# Patient Record
Sex: MALE | Race: OTHER | Employment: FULL TIME | URBAN - METROPOLITAN AREA
[De-identification: names, ages, dates, MRNs, and addresses within clinical notes are randomized per-mention and may not be internally consistent; named-entity substitution may affect disease eponyms.]

---

## 2017-06-02 ENCOUNTER — OFFICE VISIT (OUTPATIENT)
Dept: INTERNAL MEDICINE CLINIC | Age: 57
End: 2017-06-02

## 2017-06-02 VITALS
OXYGEN SATURATION: 100 % | DIASTOLIC BLOOD PRESSURE: 74 MMHG | TEMPERATURE: 97.9 F | WEIGHT: 189.8 LBS | RESPIRATION RATE: 16 BRPM | HEIGHT: 70 IN | HEART RATE: 72 BPM | BODY MASS INDEX: 27.17 KG/M2 | SYSTOLIC BLOOD PRESSURE: 144 MMHG

## 2017-06-02 DIAGNOSIS — I10 ESSENTIAL HYPERTENSION, BENIGN: ICD-10-CM

## 2017-06-02 DIAGNOSIS — Z12.5 PROSTATE CANCER SCREENING: ICD-10-CM

## 2017-06-02 DIAGNOSIS — M54.50 ACUTE RIGHT-SIDED LOW BACK PAIN WITHOUT SCIATICA: ICD-10-CM

## 2017-06-02 DIAGNOSIS — J01.80 OTHER ACUTE SINUSITIS, RECURRENCE NOT SPECIFIED: Primary | ICD-10-CM

## 2017-06-02 DIAGNOSIS — E78.5 DYSLIPIDEMIA: ICD-10-CM

## 2017-06-02 DIAGNOSIS — Z12.11 COLON CANCER SCREENING: ICD-10-CM

## 2017-06-02 RX ORDER — AZITHROMYCIN 250 MG/1
250 TABLET, FILM COATED ORAL SEE ADMIN INSTRUCTIONS
Qty: 6 TAB | Refills: 0 | Status: SHIPPED | OUTPATIENT
Start: 2017-06-02 | End: 2017-06-07

## 2017-06-02 NOTE — PROGRESS NOTES
HISTORY OF PRESENT ILLNESS  Karen Maier is a 55 y.o. male. HPI   F/u HTN dyslipidemia  Stopped smoking 2 weeks ago, takes wellbutrin just once daily  Has been taking lipitor about every other day  Pain in left back area , worse with bending of back , sxs x 5 days  C/o sinus drainage and chest congestion, clear /white. Some cold sweats  Some bph and ED sxs--newly remarried    Patient Active Problem List    Diagnosis Date Noted    MVP (mitral valve prolapse) 03/12/2012    Essential hypertension, benign 02/15/2010    Depression with anxiety 02/15/2010    Cigarette smoker 02/15/2010    Fibromyalgia 02/15/2010    Concussion 02/15/2010    Dyslipidemia 02/15/2010     Current Outpatient Prescriptions   Medication Sig Dispense Refill    azithromycin (ZITHROMAX) 250 mg tablet Take 1 Tab by mouth See Admin Instructions for 5 days. 6 Tab 0    propranolol LA (INDERAL LA) 80 mg SR capsule TAKE 2 CAPSULES DAILY 180 Cap 3    atorvastatin (LIPITOR) 10 mg tablet Take 1 Tab by mouth daily. 90 Tab 3    amLODIPine-benazepril (LOTREL) 5-20 mg per capsule Take 1 Cap by mouth daily. 90 Cap 3    buPROPion SR (WELLBUTRIN SR) 150 mg SR tablet Take 1 Tab by mouth two (2) times a day. 180 Tab 3    multivitamin (ONE A DAY) tablet Take 1 Tab by mouth daily.  Glucosamine &Chondroit-MV-Min3 812-007-73-0.5 mg Tab Take  by mouth two (2) times a day.        Allergies   Allergen Reactions    Niaspan [Niacin] Other (comments)     flushing      Lab Results  Component Value Date/Time   Glucose 86 10/21/2016 09:40 AM   LDL, calculated 85 10/21/2016 09:40 AM   Creatinine 0.93 10/21/2016 09:40 AM      Lab Results  Component Value Date/Time   Cholesterol, total 133 10/21/2016 09:40 AM   HDL Cholesterol 32 10/21/2016 09:40 AM   LDL, calculated 85 10/21/2016 09:40 AM   Triglyceride 78 10/21/2016 09:40 AM   CHOL/HDL Ratio 6.2 03/05/2010 08:58 AM       Lab Results  Component Value Date/Time   GFR est  10/21/2016 09:40 AM   GFR est non-AA 93 10/21/2016 09:40 AM   Creatinine 0.93 10/21/2016 09:40 AM   BUN 11 10/21/2016 09:40 AM   Sodium 136 10/21/2016 09:40 AM   Potassium 4.6 10/21/2016 09:40 AM   Chloride 97 10/21/2016 09:40 AM   CO2 25 10/21/2016 09:40 AM         ROS    Physical Exam   Constitutional: He appears well-developed and well-nourished. No distress. Appears stated age   HENT:   Head: Normocephalic. Cardiovascular: Normal rate, regular rhythm and normal heart sounds. Pulmonary/Chest: Effort normal and breath sounds normal.   Abdominal: Soft. Musculoskeletal: He exhibits no edema. Neurological: He is alert. Psychiatric: He has a normal mood and affect. Nursing note and vitals reviewed. ASSESSMENT and PLAN  Marino Flynn was seen today for blood pressure check and back pain. Diagnoses and all orders for this visit:    Other acute sinusitis, recurrence not specified   zpak  Acute right-sided low back pain without sciatica   Doubt stone, pt declines ua and xr KUB   Pt will notify MD if not improved   Heat, ice , nsaids  Essential hypertension, benign   controlled  Dyslipidemia   Taking lipitor qod  Prostate cancer screening  -     PSA - PROSTATE SPECIFIC AG  -     REFERRAL TO UROLOGY    Colon cancer screening  -     REFERRAL TO GASTROENTEROLOGY    Other orders  -     azithromycin (ZITHROMAX) 250 mg tablet; Take 1 Tab by mouth See Admin Instructions for 5 days. Follow-up Disposition:  Return in about 6 months (around 12/2/2017) for htn hld .

## 2017-06-02 NOTE — PROGRESS NOTES
Chief Complaint   Patient presents with    Blood Pressure Check     6 mo f/u    Back Pain     going on since last Sunday. Feels like he has flu or kidney stone, causing nausea. Better today than has been     Health Maintenance Due   Topic Date Due    Pneumococcal 19-64 Medium Risk (1 of 1 - PPSV23) 11/11/1980    FOBT Q 1 YEAR AGE 50-75  11/11/2011    DTaP/Tdap/Td series (1 - Tdap) 03/16/2013     Coordination of Care Questions    1. Have you been to the ER, urgent care clinic since your last visit? No       Hospitalized since your last visit? No    2. Have you seen or consulted any other health care providers outside of the [address] [place] [address] since your last visit? Include any pap smears or colon screening.  No

## 2017-06-02 NOTE — MR AVS SNAPSHOT
Visit Information Date & Time Provider Department Dept. Phone Encounter #  
 6/2/2017  8:45 AM Perry Bolton, [address] [place] [address],[address] [phone] [id number] Follow-up Instructions Return in about 6 months (around 12/2/2017) for htn hld . Upcoming Health Maintenance Date Due Pneumococcal 19-64 Medium Risk (1 of 1 - PPSV23) 11/11/1980 FOBT Q 1 YEAR AGE 50-75 11/11/2011 DTaP/Tdap/Td series (1 - Tdap) 3/16/2013 INFLUENZA AGE 9 TO ADULT 8/1/2017 Allergies as of 6/2/2017  Review Complete On: 6/2/2017 By: Deloris Zeenat Severity Noted Reaction Type Reactions Niaspan [Niacin]  03/15/2013    Other (comments)  
 flushing Current Immunizations  Reviewed on 3/14/2014 Name Date Influenza Vaccine (Quad) PF 10/21/2016, 12/17/2015 Influenza Vaccine PF 10/10/2014 Td, Adsorbed PF 3/15/2013 11:00 AM  
  
 Not reviewed this visit You Were Diagnosed With   
  
 Codes Comments Other acute sinusitis, recurrence not specified    -  Primary ICD-10-CM: J01.80 ICD-9-CM: 461.8 Acute right-sided low back pain without sciatica     ICD-10-CM: M54.5 ICD-9-CM: 724.2 Essential hypertension, benign     ICD-10-CM: I10 
ICD-9-CM: 401.1 Dyslipidemia     ICD-10-CM: E78.5 ICD-9-CM: 272.4 Prostate cancer screening     ICD-10-CM: Z12.5 ICD-9-CM: V76.44 Colon cancer screening     ICD-10-CM: Z12.11 ICD-9-CM: V76.51 Vitals BP Pulse Temp Resp Height(growth percentile) Weight(growth percentile) 144/74 (BP 1 Location: Right arm, BP Patient Position: Sitting) 72 97.9 °F (36.6 °C) (Oral) 16 5' 10\" (1.778 m) 189 lb 12.8 oz (86.1 kg) SpO2 BMI Smoking Status 100% 27.23 kg/m2 Current Every Day Smoker Vitals History BMI and BSA Data Body Mass Index Body Surface Area  
 27.23 kg/m 2 2.06 m 2 Preferred Pharmacy Pharmacy Name Phone  [id number] [id number], [place] AT AT [facility] [phone] Your Updated Medication List  
  
   
This list is accurate as of: 6/2/17  9:08 AM.  Always use your most recent med list. amLODIPine-benazepril 5-20 mg per capsule Commonly known as:  Edythe Lien Take 1 Cap by mouth daily. atorvastatin 10 mg tablet Commonly known as:  LIPITOR Take 1 Tab by mouth daily. azithromycin 250 mg tablet Commonly known as:  Ancel Sly Take 1 Tab by mouth See Admin Instructions for 5 days. buPROPion  mg SR tablet Commonly known as:  Francisco Stagger Take 1 Tab by mouth two (2) times a day. Glucosamine &Chondroit-MV-Min3 585-975-53-0.5 mg Tab Take  by mouth two (2) times a day. multivitamin tablet Commonly known as:  ONE A DAY Take 1 Tab by mouth daily. propranolol LA 80 mg SR capsule Commonly known as:  INDERAL LA  
TAKE 2 CAPSULES DAILY Prescriptions Printed Refills  
 azithromycin (ZITHROMAX) 250 mg tablet 0 Sig: Take 1 Tab by mouth See Admin Instructions for 5 days. Class: Print Route: Oral  
  
We Performed the Following PROSTATE SPECIFIC AG (PSA) P3312904 CPT(R)] REFERRAL TO GASTROENTEROLOGY [LXK20 Custom] Comments:  
 Please evaluate patient for screening colonoscopy REFERRAL TO UROLOGY [BLG049 Custom] Comments:  
 Please evaluate patient for bph and ED Follow-up Instructions Return in about 6 months (around 12/2/2017) for htn hld . Referral Information Referral ID Referred By Referred To  
  
 [id number] Eusebia Budlandon [facility] [address] [facility] [address] [place], [address] [facility] Visits Status Start Date End Date 1 New Request 6/2/17 6/2/18 If your referral has a status of pending review or denied, additional information will be sent to support the outcome of this decision.   
 Referral ID Referred By Referred To  
 [id number] Marilee SHERIDAN [address]  
 [address] [facility] [address] [place], [address] [facility] Visits Status Start Date End Date 1 New Request 6/2/17 6/2/18 If your referral has a status of pending review or denied, additional information will be sent to support the outcome of this decision. Introducing [facility] & HEALTH SERVICES! Dear Tana Giraldo: Thank you for requesting a [employer] account. Our records indicate that you already have an active [employer] account. You can access your account anytime at [email or web address] Did you know that you can access your hospital and ER discharge instructions at any time in [employer]? You can also review all of your test results from your hospital stay or ER visit. Additional Information If you have questions, please visit the Frequently Asked Questions section of the [employer] website at [email or web address] Remember, [employer] is NOT to be used for urgent needs. For medical emergencies, dial 911. Now available from your iPhone and Android! Please provide this summary of care documentation to your next provider. Your primary care clinician is listed as Sharita SHERIDNA. If you have any questions after today's visit, please call [phone].

## 2017-06-03 LAB — PSA SERPL-MCNC: 2.4 NG/ML (ref 0–4)

## 2017-11-10 RX ORDER — PROPRANOLOL HYDROCHLORIDE 80 MG/1
CAPSULE, EXTENDED RELEASE ORAL
Qty: 180 CAP | Refills: 3 | Status: SHIPPED | OUTPATIENT
Start: 2017-11-10 | End: 2018-12-06 | Stop reason: SDUPTHER

## 2017-11-10 RX ORDER — AMLODIPINE AND BENAZEPRIL HYDROCHLORIDE 5; 20 MG/1; MG/1
CAPSULE ORAL
Qty: 90 CAP | Refills: 3 | Status: SHIPPED | OUTPATIENT
Start: 2017-11-10 | End: 2018-12-06 | Stop reason: SDUPTHER

## 2018-06-01 ENCOUNTER — OFFICE VISIT (OUTPATIENT)
Dept: INTERNAL MEDICINE CLINIC | Age: 58
End: 2018-06-01

## 2018-06-01 VITALS
HEIGHT: 70 IN | HEART RATE: 68 BPM | TEMPERATURE: 98.2 F | BODY MASS INDEX: 27.77 KG/M2 | SYSTOLIC BLOOD PRESSURE: 134 MMHG | DIASTOLIC BLOOD PRESSURE: 80 MMHG | OXYGEN SATURATION: 98 % | WEIGHT: 194 LBS

## 2018-06-01 DIAGNOSIS — Z12.5 PROSTATE CANCER SCREENING: ICD-10-CM

## 2018-06-01 DIAGNOSIS — Z12.11 COLON CANCER SCREENING: ICD-10-CM

## 2018-06-01 DIAGNOSIS — I10 ESSENTIAL HYPERTENSION, BENIGN: Primary | ICD-10-CM

## 2018-06-01 DIAGNOSIS — E78.5 DYSLIPIDEMIA: ICD-10-CM

## 2018-06-01 DIAGNOSIS — F41.8 DEPRESSION WITH ANXIETY: ICD-10-CM

## 2018-06-01 NOTE — MR AVS SNAPSHOT
[address] [facility] 83. 
[phone] Patient: Annelise Hodge MRN:  [id number] Visit Information Date & Time Provider Department Dept. Phone Encounter #  
 2018  9:00 AM Jordan Chavez, [address] [place] [address],[address] [phone] [id number] Follow-up Instructions Return in about 6 months (around 2018) for htn chol smoking psa. Upcoming Health Maintenance Date Due Pneumococcal 19-64 Medium Risk (1 of 1 - PPSV23) 1980 FOBT Q 1 YEAR AGE 50-75 2011 DTaP/Tdap/Td series (1 - Tdap) 3/16/2013 Influenza Age 5 to Adult 2018 Allergies as of 2018  Review Complete On: 2018 By: Renae Cancino LPN Severity Noted Reaction Type Reactions Niaspan [Niacin]  03/15/2013    Other (comments)  
 flushing Current Immunizations  Reviewed on 3/14/2014 Name Date Influenza Vaccine (Quad) PF 10/21/2016, 2015 Influenza Vaccine PF 10/10/2014 Td, Adsorbed PF 3/15/2013 11:00 AM  
  
 Not reviewed this visit You Were Diagnosed With   
  
 Codes Comments Essential hypertension, benign    -  Primary ICD-10-CM: I10 
ICD-9-CM: 401.1 Dyslipidemia     ICD-10-CM: E78.5 ICD-9-CM: 272.4 Depression with anxiety     ICD-10-CM: F41.8 ICD-9-CM: 300.4 Prostate cancer screening     ICD-10-CM: Z12.5 ICD-9-CM: V76.44 Colon cancer screening     ICD-10-CM: Z12.11 ICD-9-CM: V76.51 Vitals BP Pulse Temp Height(growth percentile) Weight(growth percentile) SpO2  
 134/80 68 98.2 °F (36.8 °C) (Oral) 5' 10\" (1.778 m) 194 lb (88 kg) 98% BMI Smoking Status 27.84 kg/m2 Current Every Day Smoker Vitals History BMI and BSA Data Body Mass Index Body Surface Area  
 27.84 kg/m 2 2.08 m 2 Preferred Pharmacy Pharmacy Name Phone  [facility] [phone] Your Updated Medication List  
  
   
This list is accurate as of 6/1/18  9:26 AM.  Always use your most recent med list. amLODIPine-benazepril 5-20 mg per capsule Commonly known as:  LOTREL  
TAKE 1 CAPSULE DAILY  
  
 atorvastatin 10 mg tablet Commonly known as:  LIPITOR Take 1 Tab by mouth daily. buPROPion  mg SR tablet Commonly known as:  Elmer Torey Take 1 Tab by mouth two (2) times a day. Glucosamine &Chondroit-MV-Min3 409-158-57-0.5 mg Tab Take  by mouth two (2) times a day. multivitamin tablet Commonly known as:  ONE A DAY Take 1 Tab by mouth daily. propranolol LA 80 mg SR capsule Commonly known as:  INDERAL LA  
TAKE 2 CAPSULES DAILY We Performed the Following CBC W/O DIFF [70213 CPT(R)] LIPID PANEL [63112 CPT(R)] METABOLIC PANEL, COMPREHENSIVE [00065 CPT(R)] PSA W/ REFLX FREE PSA [39642 CPT(R)] REFERRAL TO GASTROENTEROLOGY [ISC59 Custom] Follow-up Instructions Return in about 6 months (around 12/1/2018) for htn chol smoking psa. Referral Information Referral ID Referred By Referred To  
  
 [id number] ARVIN, [address]   
   [address] [place], [address] [facility] Visits Status Start Date End Date 1 New Request 6/1/18 6/1/19 If your referral has a status of pending review or denied, additional information will be sent to support the outcome of this decision. Introducing [facility] & HEALTH SERVICES! Dear Misael Coins: Thank you for requesting a [employer] account. Our records indicate that you already have an active [employer] account. You can access your account anytime at [email or web address] Did you know that you can access your hospital and ER discharge instructions at any time in [employer]? You can also review all of your test results from your hospital stay or ER visit. Additional Information If you have questions, please visit the Frequently Asked Questions section of the [employer] website at [email or web address] Remember, [employer] is NOT to be used for urgent needs. For medical emergencies, dial 911. Now available from your iPhone and Android! Please provide this summary of care documentation to your next provider. Your primary care clinician is listed as Jaky SHERIDAN. If you have any questions after today's visit, please call [phone].

## 2018-06-01 NOTE — PROGRESS NOTES
HISTORY OF PRESENT ILLNESS  Kevin Perry is a 65 y.o. male. HPI   F/u HTN dyslipidemia  Gained a few lbs in weight. Has restarted some tobacco  Stopped wellbutrin  Not having problems with depression/anxiety recently  At home bp 120-135/60-70 usually    Last visit:    Stopped smoking 2 weeks ago, takes wellbutrin just once daily  Has been taking lipitor about every other day  Pain in left back area , worse with bending of back , sxs x 5 days  C/o sinus drainage and chest congestion, clear /white. Some cold sweats  Some bph and ED sxs--newly remarried       Patient Active Problem List    Diagnosis Date Noted    MVP (mitral valve prolapse) 03/12/2012    Essential hypertension, benign 02/15/2010    Depression with anxiety 02/15/2010    Cigarette smoker 02/15/2010    Fibromyalgia 02/15/2010    Concussion 02/15/2010    Dyslipidemia 02/15/2010     Current Outpatient Prescriptions   Medication Sig Dispense Refill    amLODIPine-benazepril (LOTREL) 5-20 mg per capsule TAKE 1 CAPSULE DAILY 90 Cap 3    propranolol LA (INDERAL LA) 80 mg SR capsule TAKE 2 CAPSULES DAILY 180 Cap 3    atorvastatin (LIPITOR) 10 mg tablet Take 1 Tab by mouth daily. 90 Tab 3    buPROPion SR (WELLBUTRIN SR) 150 mg SR tablet Take 1 Tab by mouth two (2) times a day. 180 Tab 3    multivitamin (ONE A DAY) tablet Take 1 Tab by mouth daily.  Glucosamine &Chondroit-MV-Min3 589-513-67-0.5 mg Tab Take  by mouth two (2) times a day.        Allergies   Allergen Reactions    Niaspan [Niacin] Other (comments)     flushing     Social History   Substance Use Topics    Smoking status: Current Every Day Smoker     Packs/day: 0.50    Smokeless tobacco: Never Used    Alcohol use Yes      Comment: occasional      Lab Results  Component Value Date/Time   Glucose 86 10/21/2016 09:40 AM   LDL, calculated 85 10/21/2016 09:40 AM   Creatinine 0.93 10/21/2016 09:40 AM      Lab Results  Component Value Date/Time   Cholesterol, total 133 10/21/2016 09:40 AM   HDL Cholesterol 32 (L) 10/21/2016 09:40 AM   LDL, calculated 85 10/21/2016 09:40 AM   Triglyceride 78 10/21/2016 09:40 AM   CHOL/HDL Ratio 6.2 (H) 03/05/2010 08:58 AM     Lab Results  Component Value Date/Time   GFR est non-AA 93 10/21/2016 09:40 AM   GFR est  10/21/2016 09:40 AM   Creatinine 0.93 10/21/2016 09:40 AM   BUN 11 10/21/2016 09:40 AM   Sodium 136 10/21/2016 09:40 AM   Potassium 4.6 10/21/2016 09:40 AM   Chloride 97 10/21/2016 09:40 AM   CO2 25 10/21/2016 09:40 AM        ROS    Physical Exam   Constitutional: He appears well-developed and well-nourished. No distress. Appears stated age   HENT:   Head: Normocephalic. Cardiovascular: Normal rate, regular rhythm and normal heart sounds. Exam reveals no gallop and no friction rub. No murmur heard. Pulmonary/Chest: Effort normal and breath sounds normal. No respiratory distress. He has no wheezes. He has no rales. He exhibits no tenderness. Abdominal: Soft. Musculoskeletal: He exhibits no edema. Neurological: He is alert. Psychiatric: He has a normal mood and affect. Nursing note and vitals reviewed. ASSESSMENT and PLAN  Diagnoses and all orders for this visit:    1. Essential hypertension, benign  -     CBC W/O DIFF  -     METABOLIC PANEL, COMPREHENSIVE   controlled  2. Dyslipidemia  -     METABOLIC PANEL, COMPREHENSIVE  -     LIPID PANEL   Continue statin    3. Depression with anxiety   controlled without wellbutrin  4. Prostate cancer screening  -     PSA W/ REFLX FREE PSA       5. Colon cancer screening  -     [facility] agreeable to schedule, FH polyps    6. Smoker   Advised complete cessation  Follow-up Disposition:  Return in about 6 months (around 12/1/2018) for htn chol smoking psa.

## 2018-06-02 LAB
ALBUMIN SERPL-MCNC: 4.3 G/DL (ref 3.5–5.5)
ALBUMIN/GLOB SERPL: 1.5 {RATIO} (ref 1.2–2.2)
ALP SERPL-CCNC: 102 IU/L (ref 39–117)
ALT SERPL-CCNC: 22 IU/L (ref 0–44)
AST SERPL-CCNC: 19 IU/L (ref 0–40)
BILIRUB SERPL-MCNC: 0.5 MG/DL (ref 0–1.2)
BUN SERPL-MCNC: 10 MG/DL (ref 6–24)
BUN/CREAT SERPL: 11 (ref 9–20)
CALCIUM SERPL-MCNC: 9.7 MG/DL (ref 8.7–10.2)
CHLORIDE SERPL-SCNC: 100 MMOL/L (ref 96–106)
CHOLEST SERPL-MCNC: 133 MG/DL (ref 100–199)
CO2 SERPL-SCNC: 25 MMOL/L (ref 18–29)
CREAT SERPL-MCNC: 0.94 MG/DL (ref 0.76–1.27)
ERYTHROCYTE [DISTWIDTH] IN BLOOD BY AUTOMATED COUNT: 14.2 % (ref 12.3–15.4)
GFR SERPLBLD CREATININE-BSD FMLA CKD-EPI: 104 ML/MIN/1.73
GFR SERPLBLD CREATININE-BSD FMLA CKD-EPI: 90 ML/MIN/1.73
GLOBULIN SER CALC-MCNC: 2.8 G/DL (ref 1.5–4.5)
GLUCOSE SERPL-MCNC: 100 MG/DL (ref 65–99)
HCT VFR BLD AUTO: 44.8 % (ref 37.5–51)
HDLC SERPL-MCNC: 32 MG/DL
HGB BLD-MCNC: 14.6 G/DL (ref 13–17.7)
LDLC SERPL CALC-MCNC: 84 MG/DL (ref 0–99)
MCH RBC QN AUTO: 27.1 PG (ref 26.6–33)
MCHC RBC AUTO-ENTMCNC: 32.6 G/DL (ref 31.5–35.7)
MCV RBC AUTO: 83 FL (ref 79–97)
PLATELET # BLD AUTO: 281 X10E3/UL (ref 150–379)
POTASSIUM SERPL-SCNC: 4.5 MMOL/L (ref 3.5–5.2)
PROT SERPL-MCNC: 7.1 G/DL (ref 6–8.5)
PSA SERPL-MCNC: 3.5 NG/ML (ref 0–4)
RBC # BLD AUTO: 5.38 X10E6/UL (ref 4.14–5.8)
REFLEX CRITERIA: NORMAL
SODIUM SERPL-SCNC: 140 MMOL/L (ref 134–144)
TRIGL SERPL-MCNC: 86 MG/DL (ref 0–149)
VLDLC SERPL CALC-MCNC: 17 MG/DL (ref 5–40)
WBC # BLD AUTO: 9.2 X10E3/UL (ref 3.4–10.8)

## 2018-06-05 ENCOUNTER — TELEPHONE (OUTPATIENT)
Dept: INTERNAL MEDICINE CLINIC | Age: 58
End: 2018-06-05

## 2018-06-05 DIAGNOSIS — R97.20 RISING PSA LEVEL: Primary | ICD-10-CM

## 2018-06-05 NOTE — TELEPHONE ENCOUNTER
----- Message from Teodoro Johnson sent at 6/5/2018 11:35 AM EDT -----  Regarding: Dr. Kenya Rocha  Patient is returning your call about his test results. His number is [phone].       Message copied/pasted from [facility]

## 2018-06-05 NOTE — TELEPHONE ENCOUNTER
The patient missed a call and is requesting a call back.  (L)[phone]       Message received & copied from [facility] after closing on 6/4/18

## 2018-12-07 RX ORDER — AMLODIPINE AND BENAZEPRIL HYDROCHLORIDE 5; 20 MG/1; MG/1
CAPSULE ORAL
Qty: 90 CAP | Refills: 3 | Status: SHIPPED | OUTPATIENT
Start: 2018-12-07 | End: 2019-12-23 | Stop reason: SDUPTHER

## 2018-12-07 RX ORDER — PROPRANOLOL HYDROCHLORIDE 80 MG/1
CAPSULE, EXTENDED RELEASE ORAL
Qty: 180 CAP | Refills: 3 | Status: SHIPPED | OUTPATIENT
Start: 2018-12-07 | End: 2019-12-23 | Stop reason: SDUPTHER

## 2019-02-01 ENCOUNTER — OFFICE VISIT (OUTPATIENT)
Dept: INTERNAL MEDICINE CLINIC | Age: 59
End: 2019-02-01

## 2019-02-01 DIAGNOSIS — N40.0 BENIGN PROSTATIC HYPERPLASIA WITHOUT LOWER URINARY TRACT SYMPTOMS: ICD-10-CM

## 2019-02-01 DIAGNOSIS — J30.5 ALLERGIC RHINITIS DUE TO FOOD: ICD-10-CM

## 2019-02-01 DIAGNOSIS — F17.200 SMOKER: ICD-10-CM

## 2019-02-01 DIAGNOSIS — R00.2 PALPITATIONS: ICD-10-CM

## 2019-02-01 DIAGNOSIS — E78.00 PURE HYPERCHOLESTEROLEMIA: Primary | ICD-10-CM

## 2019-02-01 DIAGNOSIS — I10 ESSENTIAL HYPERTENSION: ICD-10-CM

## 2019-02-01 DIAGNOSIS — Z12.11 COLON CANCER SCREENING: ICD-10-CM

## 2019-02-01 RX ORDER — ATORVASTATIN CALCIUM 10 MG/1
10 TABLET, FILM COATED ORAL DAILY
Qty: 90 TAB | Refills: 3 | Status: SHIPPED | OUTPATIENT
Start: 2019-02-01 | End: 2020-03-09

## 2019-02-01 NOTE — PROGRESS NOTES
Chief Complaint   Patient presents with    Medication Evaluation     concerns with allergy medication mixing with Lipitor    Medication Refill     lipitor    Labs     6 month follows up

## 2019-02-01 NOTE — PROGRESS NOTES
HISTORY OF PRESENT ILLNESS  Yvonne Rodriguez is a 63 y.o. male. HPI     F/u HTN dyslipidemia , elevated PSA  Last PSA up to 3.5 referred to  MD  bph per gu MD--had prostate biopsy--inflammation. Had 4k test and then bx  Will f/u again in 3 mos for PSA check  Due for screening baseline colonoscopy  Has had runny nose with the weather -takes equate  1 janis /week smoer  C/o palpiations maybe monthly when laying on left side-lasts seconds to minutes  Has been off statin x 3 months    Last OV  Gained a few lbs in weight. Has restarted some tobacco  Stopped wellbutrin  Not having problems with depression/anxiety recently  At home bp 120-135/60-70 usually     Last visit:     Stopped smoking 2 weeks ago, takes wellbutrin just once daily  Has been taking lipitor about every other day  Pain in left back area , worse with bending of back , sxs x 5 days  C/o sinus drainage and chest congestion, clear /white. Some cold sweats  Some bph and ED sxs--newly remarried              Patient Active Problem List    Diagnosis Date Noted    MVP (mitral valve prolapse) 03/12/2012    Essential hypertension, benign 02/15/2010    Depression with anxiety 02/15/2010    Cigarette smoker 02/15/2010    Fibromyalgia 02/15/2010    Concussion 02/15/2010    Dyslipidemia 02/15/2010     Current Outpatient Medications   Medication Sig Dispense Refill    amLODIPine-benazepril (LOTREL) 5-20 mg per capsule TAKE 1 CAPSULE DAILY 90 Cap 3    propranolol LA (INDERAL LA) 80 mg SR capsule TAKE 2 CAPSULES DAILY 180 Cap 3    atorvastatin (LIPITOR) 10 mg tablet Take 1 Tab by mouth daily. 90 Tab 3    buPROPion SR (WELLBUTRIN SR) 150 mg SR tablet Take 1 Tab by mouth two (2) times a day. 180 Tab 3    multivitamin (ONE A DAY) tablet Take 1 Tab by mouth daily.  Glucosamine &Chondroit-MV-Min3 635-992-88-0.5 mg Tab Take  by mouth two (2) times a day.        Allergies   Allergen Reactions    Niaspan [Niacin] Other (comments)     flushing     Social History Tobacco Use    Smoking status: Current Every Day Smoker     Packs/day: 0.50    Smokeless tobacco: Never Used   Substance Use Topics    Alcohol use: Yes     Comment: occasional      Lab Results   Component Value Date/Time    WBC 9.2 06/01/2018 09:41 AM    HGB 14.6 06/01/2018 09:41 AM    HCT 44.8 06/01/2018 09:41 AM    PLATELET 181 93/27/7258 09:41 AM    MCV 83 06/01/2018 09:41 AM     Lab Results   Component Value Date/Time    Glucose 100 (H) 06/01/2018 09:41 AM    LDL, calculated 84 06/01/2018 09:41 AM    Creatinine 0.94 06/01/2018 09:41 AM      Lab Results   Component Value Date/Time    Cholesterol, total 133 06/01/2018 09:41 AM    HDL Cholesterol 32 (L) 06/01/2018 09:41 AM    LDL, calculated 84 06/01/2018 09:41 AM    Triglyceride 86 06/01/2018 09:41 AM    CHOL/HDL Ratio 6.2 (H) 03/05/2010 08:58 AM     Lab Results   Component Value Date/Time    GFR est non-AA 90 06/01/2018 09:41 AM    GFR est  06/01/2018 09:41 AM    Creatinine 0.94 06/01/2018 09:41 AM    BUN 10 06/01/2018 09:41 AM    Sodium 140 06/01/2018 09:41 AM    Potassium 4.5 06/01/2018 09:41 AM    Chloride 100 06/01/2018 09:41 AM    CO2 25 06/01/2018 09:41 AM        ROS    Physical Exam   Constitutional: He appears well-developed and well-nourished. No distress. Appears stated age   HENT:   Head: Normocephalic. Cardiovascular: Normal rate, regular rhythm and normal heart sounds. Exam reveals no gallop and no friction rub. No murmur heard. Pulmonary/Chest: Effort normal and breath sounds normal. No respiratory distress. He has no wheezes. He has no rales. He exhibits no tenderness. Abdominal: Soft. Musculoskeletal: He exhibits no edema. Neurological: He is alert. Psychiatric: He has a normal mood and affect. Nursing note and vitals reviewed. ASSESSMENT and PLAN  Diagnoses and all orders for this visit:    1. Pure hypercholesterolemia  -     METABOLIC PANEL, COMPREHENSIVE  -     TSH 3RD GENERATION   Refill statin  2.  Essential hypertension  -     METABOLIC PANEL, COMPREHENSIVE  -     TSH 3RD GENERATION   controlled  3. Benign prostatic hyperplasia without lower urinary tract symptoms   fairy asymptomatic-f/u Dr Rizzo Members  4. Smoker   Advised cessation  5. Colon cancer screening   Advised pt to schedule colonoscopy with GI MD  6. Allergic rhinitis due to food   otc equate ok  7. Palpitations  -     METABOLIC PANEL, COMPREHENSIVE  -     TSH 3RD GENERATION  -     REFERRAL TO CARDIOLOGY    Other orders  -     atorvastatin (LIPITOR) 10 mg tablet; Take 1 Tab by mouth daily. Follow-up Disposition:  Return in about 6 months (around 8/1/2019) for cpe.

## 2019-02-02 LAB
ALBUMIN SERPL-MCNC: 4.4 G/DL (ref 3.5–5.5)
ALBUMIN/GLOB SERPL: 1.4 {RATIO} (ref 1.2–2.2)
ALP SERPL-CCNC: 89 IU/L (ref 39–117)
ALT SERPL-CCNC: 21 IU/L (ref 0–44)
AST SERPL-CCNC: 17 IU/L (ref 0–40)
BILIRUB SERPL-MCNC: 0.3 MG/DL (ref 0–1.2)
BUN SERPL-MCNC: 13 MG/DL (ref 6–24)
BUN/CREAT SERPL: 15 (ref 9–20)
CALCIUM SERPL-MCNC: 9.5 MG/DL (ref 8.7–10.2)
CHLORIDE SERPL-SCNC: 101 MMOL/L (ref 96–106)
CO2 SERPL-SCNC: 25 MMOL/L (ref 20–29)
CREAT SERPL-MCNC: 0.88 MG/DL (ref 0.76–1.27)
GLOBULIN SER CALC-MCNC: 3.1 G/DL (ref 1.5–4.5)
GLUCOSE SERPL-MCNC: 90 MG/DL (ref 65–99)
POTASSIUM SERPL-SCNC: 4.6 MMOL/L (ref 3.5–5.2)
PROT SERPL-MCNC: 7.5 G/DL (ref 6–8.5)
SODIUM SERPL-SCNC: 140 MMOL/L (ref 134–144)
TSH SERPL DL<=0.005 MIU/L-ACNC: 2.14 UIU/ML (ref 0.45–4.5)

## 2019-08-09 ENCOUNTER — OFFICE VISIT (OUTPATIENT)
Dept: CARDIOLOGY CLINIC | Age: 59
End: 2019-08-09

## 2019-08-09 ENCOUNTER — CLINICAL SUPPORT (OUTPATIENT)
Dept: CARDIOLOGY CLINIC | Age: 59
End: 2019-08-09

## 2019-08-09 VITALS
HEIGHT: 70 IN | BODY MASS INDEX: 27.8 KG/M2 | SYSTOLIC BLOOD PRESSURE: 142 MMHG | WEIGHT: 194.2 LBS | RESPIRATION RATE: 16 BRPM | DIASTOLIC BLOOD PRESSURE: 82 MMHG | HEART RATE: 74 BPM | OXYGEN SATURATION: 96 %

## 2019-08-09 DIAGNOSIS — I34.1 MVP (MITRAL VALVE PROLAPSE): ICD-10-CM

## 2019-08-09 DIAGNOSIS — E78.5 DYSLIPIDEMIA: ICD-10-CM

## 2019-08-09 DIAGNOSIS — I10 ESSENTIAL HYPERTENSION, BENIGN: ICD-10-CM

## 2019-08-09 DIAGNOSIS — R00.2 PALPITATIONS: Primary | ICD-10-CM

## 2019-08-09 DIAGNOSIS — R00.2 PALPITATION: Primary | ICD-10-CM

## 2019-08-09 RX ORDER — IBUPROFEN 200 MG
200 TABLET ORAL
COMMUNITY
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
## 2019-08-09 NOTE — PROGRESS NOTES
1. Have you been to the ER, urgent care clinic since your last visit? Hospitalized since your last visit? NO    2. Have you seen or consulted any other health care providers outside of the [address] [place] [address] since your last visit? Include any pap smears or colon screening. NO    NEW PATIENT. C/O IRREGULAR HEART BEATS.

## 2019-08-16 NOTE — PROGRESS NOTES
Sylvain Smoke,    Please call the patient and inform that 24-hour holter monitor only showed PVCs that were frequent. This is not abnormal, essentially benign. He is already on propranolol for this, and would continue the same. He did not report any symptoms on the monitor, so we are assuming that he did not feel any of his PVCs. Will call back once echocardiogram is complete.     Thanks,  [employer]

## 2019-08-30 ENCOUNTER — OFFICE VISIT (OUTPATIENT)
Dept: CARDIOLOGY CLINIC | Age: 59
End: 2019-08-30

## 2019-08-30 VITALS
RESPIRATION RATE: 16 BRPM | HEART RATE: 80 BPM | WEIGHT: 192.4 LBS | OXYGEN SATURATION: 97 % | BODY MASS INDEX: 27.54 KG/M2 | HEIGHT: 70 IN | SYSTOLIC BLOOD PRESSURE: 140 MMHG | DIASTOLIC BLOOD PRESSURE: 80 MMHG

## 2019-08-30 DIAGNOSIS — I34.1 MVP (MITRAL VALVE PROLAPSE): ICD-10-CM

## 2019-08-30 DIAGNOSIS — E78.5 DYSLIPIDEMIA: ICD-10-CM

## 2019-08-30 DIAGNOSIS — I10 ESSENTIAL HYPERTENSION, BENIGN: ICD-10-CM

## 2019-08-30 DIAGNOSIS — R00.2 PALPITATIONS: Primary | ICD-10-CM

## 2019-08-30 NOTE — PROGRESS NOTES
NAME:  Remy Ny   :   1960   MRN:   [id number]   PCP:  Gibson Lyn MD           Subjective: The patient is a 63y.o. year old male  who returns for a routine follow-up. Since the last visit, patient reports no change in exercise tolerance, chest pain, edema, medication intolerance, shortness of breath, PND/orthopnea wheezing, sputum, syncope, dizziness or light headedness. C/o palpitations. Past Medical History:   Diagnosis Date    CAD (coronary artery disease)     mitral valve prolapse    Hypertension         ICD-10-CM ICD-9-CM    1. Palpitations R00.2 785.1    2. MVP (mitral valve prolapse) I34.1 424.0    3. Essential hypertension, benign I10 401.1    4. Dyslipidemia E78.5 272.4       Social History     Tobacco Use    Smoking status: Current Every Day Smoker     Packs/day: 0.50    Smokeless tobacco: Never Used   Substance Use Topics    Alcohol use: Yes     Comment: occasional      Family History   Problem Relation Age of Onset    Diabetes Mother     Hypertension Mother     Lung Disease Father     Arthritis-osteo Father     Arthritis-osteo Brother         Review of Systems  General: Pt denies excessive weight gain or loss. Pt is able to conduct ADL's  HEENT: Denies blurred vision, headaches, epistaxis and difficulty swallowing. Respiratory: Denies shortness of breath, CABA, wheezing or stridor. Cardiovascular: Denies precordial pain, palpitations, edema or PND  Gastrointestinal: Denies poor appetite, indigestion, abdominal pain or blood in stool  Musculoskeletal: Denies pain or swelling from muscles or joints  Neurologic: Denies tremor, paresthesias, or sensory motor disturbance  Skin: Denies rash, itching or texture change. Objective:       Vitals:    19 1044   BP: 140/80   Pulse: 80   Resp: 16   SpO2: 97%   Weight: 192 lb 6.4 oz (87.3 kg)   Height: 5' 10\" (1.778 m)    Body mass index is 27.61 kg/m².       General PE  Mental Status - Alert. General Appearance - Not in acute distress. Chest and Lung Exam   Inspection: Accessory muscles - No use of accessory muscles in breathing. Auscultation:   Breath sounds: - Normal.    Cardiovascular   Inspection: Jugular vein - Bilateral - Inspection Normal.  Palpation/Percussion:   Apical Impulse: - Normal.  Auscultation: Rhythm - Regular. Heart Sounds - S1 WNL and S2 WNL. No S3 or S4. Murmurs & Other Heart Sounds: Auscultation of the heart reveals - No Murmurs. Peripheral Vascular   Upper Extremity: Inspection - Bilateral - No Cyanotic nailbeds or Digital clubbing. Lower Extremity:   Palpation: Edema - Bilateral - No edema. Data Review:     EKG -EKG: normal EKG, normal sinus rhythm, unchanged from previous tracings. Medications reviewed  Current Outpatient Medications   Medication Sig    ibuprofen (MOTRIN) 200 mg tablet Take 200 mg by mouth every six (6) hours as needed for Pain.  Cetirizine (ZYRTEC) 10 mg cap Take 10 mg by mouth daily.  atorvastatin (LIPITOR) 10 mg tablet Take 1 Tab by mouth daily.  amLODIPine-benazepril (LOTREL) 5-20 mg per capsule TAKE 1 CAPSULE DAILY    propranolol LA (INDERAL LA) 80 mg SR capsule TAKE 2 CAPSULES DAILY     No current facility-administered medications for this visit. Assessment:       ICD-10-CM ICD-9-CM    1. Palpitations R00.2 785.1    2. MVP (mitral valve prolapse) I34.1 424.0    3. Essential hypertension, benign I10 401.1    4. Dyslipidemia E78.5 272.4         Plan:     Patient presents doing well and stable from cardiac standpoint. Continues to have palpitations. Echo showed normal EF, mild MVP. holter showed frequent PVC's, PAC's. Discussed increasing propranolol. He would like to wait. Continue current care and follow up in 3 months.     Yo Billingsley MD

## 2019-08-30 NOTE — PROGRESS NOTES
1. Have you been to the ER, urgent care clinic since your last visit? Hospitalized since your last visit? No    2. Have you seen or consulted any other health care providers outside of the [address] [place] [address] since your last visit? Include any pap smears or colon screening. No    Chief Complaint   Patient presents with    Results    Irregular Heart Beat    Valvular Heart Disease    Patient C/O palpitations here for review of recent testing.

## 2019-11-01 ENCOUNTER — OFFICE VISIT (OUTPATIENT)
Dept: INTERNAL MEDICINE CLINIC | Age: 59
End: 2019-11-01

## 2019-11-01 VITALS
HEIGHT: 70 IN | BODY MASS INDEX: 27.77 KG/M2 | WEIGHT: 194 LBS | OXYGEN SATURATION: 100 % | SYSTOLIC BLOOD PRESSURE: 120 MMHG | TEMPERATURE: 98.1 F | RESPIRATION RATE: 16 BRPM | DIASTOLIC BLOOD PRESSURE: 80 MMHG | HEART RATE: 71 BPM

## 2019-11-01 DIAGNOSIS — Z23 ENCOUNTER FOR IMMUNIZATION: ICD-10-CM

## 2019-11-01 DIAGNOSIS — I10 ESSENTIAL HYPERTENSION: Primary | ICD-10-CM

## 2019-11-01 DIAGNOSIS — E78.5 DYSLIPIDEMIA: ICD-10-CM

## 2019-11-01 DIAGNOSIS — F17.200 SMOKER: ICD-10-CM

## 2019-11-01 RX ORDER — BUPROPION HYDROCHLORIDE 150 MG/1
150 TABLET, EXTENDED RELEASE ORAL 2 TIMES DAILY
Qty: 180 TAB | Refills: 3 | Status: SHIPPED | OUTPATIENT
Start: 2019-11-01

## 2019-11-01 NOTE — PROGRESS NOTES
Chief Complaint   Patient presents with    Cholesterol Problem     6 month follow up    Hypertension     6 month folow up    Labs     Fasting

## 2019-11-01 NOTE — PATIENT INSTRUCTIONS
Office Policies    Phone calls/patient messages:            Please allow up to 24 hours for someone in the office to contact you about your call or message. Be mindful your provider may be out of the office or your message may require further review. We encourage you to use [facility] for your messages as this is a faster, more efficient way to communicate with our office                         Medication Refills:            Prescription medications require 48-72 business hours to process. We encourage you to use [facility] for your refills. For controlled medications: Please allow 72 business hours to process. Certain medications may require you to  a written prescription at our office. NO narcotic/controlled medications will be prescribed after 4pm Monday through Friday or on weekends              Form/Paperwork Completion:            Please note a $25 fee may incur for all paperwork for completed by our providers. We ask that you allow 7-10 business days. Pre-payment is due prior to picking up/faxing the completed form. You may also download your forms to [facility] to have your doctor print off.

## 2019-11-01 NOTE — PROGRESS NOTES
HISTORY OF PRESENT ILLNESS  Eda Roe is a 63 y.o. male. HPI      F/u HTN dyslipidemia , elevated PSA, smoker  Saw Dr Marie Isbell for palpitations--PVC's benign on BB and mild MVP, nl EF on ECHO  Due for colon screen  Smokes 1/2 ppd--wants to restart wellbutrin  No cp cough sob  wroks 4 10 hr days      Last OV  Last PSA up to 3.5 referred to LILLIANA JAMES  bph per lilliana JAMES--had prostate biopsy--inflammation. Had 4k test and then bx  Will f/u again in 3 mos for PSA check  Due for screening baseline colonoscopy  Has had runny nose with the weather -takes equate  1 janis /week smoer  C/o palpiations maybe monthly when laying on left side-lasts seconds to minutes  Has been off statin x 3 months       Patient Active Problem List    Diagnosis Date Noted    MVP (mitral valve prolapse) 03/12/2012    Essential hypertension, benign 02/15/2010    Depression with anxiety 02/15/2010    Cigarette smoker 02/15/2010    Fibromyalgia 02/15/2010    Concussion 02/15/2010    Dyslipidemia 02/15/2010     Current Outpatient Medications   Medication Sig Dispense Refill    ibuprofen (MOTRIN) 200 mg tablet Take 200 mg by mouth every six (6) hours as needed for Pain.  Cetirizine (ZYRTEC) 10 mg cap Take 10 mg by mouth daily.  atorvastatin (LIPITOR) 10 mg tablet Take 1 Tab by mouth daily.  90 Tab 3    amLODIPine-benazepril (LOTREL) 5-20 mg per capsule TAKE 1 CAPSULE DAILY 90 Cap 3    propranolol LA (INDERAL LA) 80 mg SR capsule TAKE 2 CAPSULES DAILY 180 Cap 3     Allergies   Allergen Reactions    Niaspan [Niacin] Other (comments)     flushing      Lab Results   Component Value Date/Time    WBC 9.2 06/01/2018 09:41 AM    HGB 14.6 06/01/2018 09:41 AM    HCT 44.8 06/01/2018 09:41 AM    PLATELET 953 03/64/4976 09:41 AM    MCV 83 06/01/2018 09:41 AM     Lab Results   Component Value Date/Time    Glucose 90 02/01/2019 03:37 PM    LDL, calculated 84 06/01/2018 09:41 AM    Creatinine 0.88 02/01/2019 03:37 PM      Lab Results   Component Value Date/Time    Cholesterol, total 133 06/01/2018 09:41 AM    HDL Cholesterol 32 (L) 06/01/2018 09:41 AM    LDL, calculated 84 06/01/2018 09:41 AM    Triglyceride 86 06/01/2018 09:41 AM    CHOL/HDL Ratio 6.2 (H) 03/05/2010 08:58 AM     Lab Results   Component Value Date/Time    GFR est non-AA 95 02/01/2019 03:37 PM    GFR est  02/01/2019 03:37 PM    Creatinine 0.88 02/01/2019 03:37 PM    BUN 13 02/01/2019 03:37 PM    Sodium 140 02/01/2019 03:37 PM    Potassium 4.6 02/01/2019 03:37 PM    Chloride 101 02/01/2019 03:37 PM    CO2 25 02/01/2019 03:37 PM        ROS    Physical Exam   Constitutional: He appears well-developed and well-nourished. No distress. Appears stated age   HENT:   Head: Normocephalic. Cardiovascular: Normal rate and regular rhythm. Exam reveals no gallop and no friction rub. No murmur heard. Pulmonary/Chest: Effort normal and breath sounds normal. No respiratory distress. He has no wheezes. He has no rales. He exhibits no tenderness. Abdominal: Soft. Musculoskeletal: He exhibits no edema. Neurological: He is alert. Psychiatric: He has a normal mood and affect. Nursing note and vitals reviewed. ASSESSMENT and PLAN  Diagnoses and all orders for this visit:    1. Essential hypertension  -     CBC W/O DIFF  -     METABOLIC PANEL, COMPREHENSIVE  -     LIPID PANEL   controlled  2. Encounter for immunization  -     INFLUENZA VIRUS VAC QUAD,SPLIT,PRESV FREE SYRINGE IM    3. Dyslipidemia   Continue lipitor  4. Smoker   rx wellbutrin   Pt motivated to quit  5. BPH   F/u MACY JAMES    6. Preventive   Advised shingrix and colonoscopy  Other orders  -     buPROPion SR (WELLBUTRIN SR) 150 mg SR tablet; Take 1 Tab by mouth two (2) times a day. Follow-up and Dispositions    · Return in about 6 months (around 5/1/2020) for htn dyslipidemia smoler.

## 2019-11-02 LAB
ALBUMIN SERPL-MCNC: 4.4 G/DL (ref 3.5–5.5)
ALBUMIN/GLOB SERPL: 1.6 {RATIO} (ref 1.2–2.2)
ALP SERPL-CCNC: 94 IU/L (ref 39–117)
ALT SERPL-CCNC: 25 IU/L (ref 0–44)
AST SERPL-CCNC: 19 IU/L (ref 0–40)
BILIRUB SERPL-MCNC: 0.5 MG/DL (ref 0–1.2)
BUN SERPL-MCNC: 14 MG/DL (ref 6–24)
BUN/CREAT SERPL: 14 (ref 9–20)
CALCIUM SERPL-MCNC: 9.9 MG/DL (ref 8.7–10.2)
CHLORIDE SERPL-SCNC: 102 MMOL/L (ref 96–106)
CHOLEST SERPL-MCNC: 148 MG/DL (ref 100–199)
CO2 SERPL-SCNC: 23 MMOL/L (ref 20–29)
CREAT SERPL-MCNC: 0.98 MG/DL (ref 0.76–1.27)
ERYTHROCYTE [DISTWIDTH] IN BLOOD BY AUTOMATED COUNT: 12.7 % (ref 12.3–15.4)
GLOBULIN SER CALC-MCNC: 2.7 G/DL (ref 1.5–4.5)
GLUCOSE SERPL-MCNC: 88 MG/DL (ref 65–99)
HCT VFR BLD AUTO: 43.3 % (ref 37.5–51)
HDLC SERPL-MCNC: 32 MG/DL
HGB BLD-MCNC: 14.5 G/DL (ref 13–17.7)
LDLC SERPL CALC-MCNC: 100 MG/DL (ref 0–99)
MCH RBC QN AUTO: 27.3 PG (ref 26.6–33)
MCHC RBC AUTO-ENTMCNC: 33.5 G/DL (ref 31.5–35.7)
MCV RBC AUTO: 82 FL (ref 79–97)
PLATELET # BLD AUTO: 280 X10E3/UL (ref 150–450)
POTASSIUM SERPL-SCNC: 4.9 MMOL/L (ref 3.5–5.2)
PROT SERPL-MCNC: 7.1 G/DL (ref 6–8.5)
RBC # BLD AUTO: 5.31 X10E6/UL (ref 4.14–5.8)
SODIUM SERPL-SCNC: 141 MMOL/L (ref 134–144)
TRIGL SERPL-MCNC: 80 MG/DL (ref 0–149)
VLDLC SERPL CALC-MCNC: 16 MG/DL (ref 5–40)
WBC # BLD AUTO: 8.5 X10E3/UL (ref 3.4–10.8)

## 2019-12-24 RX ORDER — AMLODIPINE AND BENAZEPRIL HYDROCHLORIDE 5; 20 MG/1; MG/1
1 CAPSULE ORAL DAILY
Qty: 90 CAP | Refills: 3 | Status: SHIPPED | OUTPATIENT
Start: 2019-12-24 | End: 2020-05-01 | Stop reason: DRUGHIGH

## 2019-12-24 RX ORDER — PROPRANOLOL HYDROCHLORIDE 80 MG/1
80 CAPSULE, EXTENDED RELEASE ORAL DAILY
Qty: 180 CAP | Refills: 3 | Status: SHIPPED | OUTPATIENT
Start: 2019-12-24 | End: 2020-05-01 | Stop reason: SDUPTHER

## 2019-12-24 NOTE — TELEPHONE ENCOUNTER
PCP: Glory Bridges MD    Last appt: 11/1/2019  Future Appointments   Date Time Provider Ene Chen   5/1/2020 10:30 AM Glory Bridges MD [facility]       Requested Prescriptions     Pending Prescriptions Disp Refills    amLODIPine-benazepril (LOTREL) 5-20 mg per capsule 90 Cap 3     Sig: Take 1 Cap by mouth daily.  propranolol LA (INDERAL LA) 80 mg SR capsule 180 Cap 3     Sig: Take 1 Cap by mouth daily.

## 2020-03-09 RX ORDER — ATORVASTATIN CALCIUM 10 MG/1
TABLET, FILM COATED ORAL
Qty: 90 TAB | Refills: 3 | Status: SHIPPED | OUTPATIENT
Start: 2020-03-09 | End: 2021-05-14

## 2020-05-01 ENCOUNTER — VIRTUAL VISIT (OUTPATIENT)
Dept: INTERNAL MEDICINE CLINIC | Age: 60
End: 2020-05-01

## 2020-05-01 DIAGNOSIS — F17.200 SMOKER: ICD-10-CM

## 2020-05-01 DIAGNOSIS — E78.5 DYSLIPIDEMIA: ICD-10-CM

## 2020-05-01 DIAGNOSIS — I10 ESSENTIAL HYPERTENSION: Primary | ICD-10-CM

## 2020-05-01 RX ORDER — PROPRANOLOL HYDROCHLORIDE 80 MG/1
160 CAPSULE, EXTENDED RELEASE ORAL DAILY
Qty: 180 CAP | Refills: 3 | Status: SHIPPED | OUTPATIENT
Start: 2020-05-01 | End: 2021-05-14

## 2020-05-01 RX ORDER — AMLODIPINE AND BENAZEPRIL HYDROCHLORIDE 5; 40 MG/1; MG/1
1 CAPSULE ORAL DAILY
Qty: 90 CAP | Refills: 3 | Status: SHIPPED | OUTPATIENT
Start: 2020-05-01 | End: 2021-05-14

## 2020-05-01 NOTE — PROGRESS NOTES
HISTORY OF PRESENT ILLNESS  Nadiya Kat is a 63 y.o. male. HPI   Nadiya Kat is a 63 y.o. male being evaluated by a Virtual Visit (video visit) encounter to address concerns as mentioned above. A caregiver was present when appropriate. Due to this being a TeleHealth encounter (During OSPHK-54 public health emergency), evaluation of the following organ systems was limited: Vitals/Constitutional/EENT/Resp/CV/GI//MS/Neuro/Skin/Heme-Lymph-Imm. Pursuant to the emergency declaration under the [facility], [address] [place] [address] authority and the [employer] Act, this Virtual Visit was conducted with patient's (and/or legal guardian's) consent, to reduce the risk of exposure to COVID-19 and provide necessary medical care. Services were provided through a video synchronous discussion virtually to substitute for in-person encounter. --Hayley Bustillo MD on 4/30/2020 at 10:26 PM    An electronic signature was used to authenticate this note. F/u HTN dyslipidemia, smoker  wellbutrin was restarted last OV -chronic smoker  [employer] quit date on Memorial day  Home /93  Palpitations controlled on BB  Walks for exercise  Stable weight 196lbs    Last OV     F/u HTN dyslipidemia , elevated PSA, smoker  Saw Dr Bola Lira for palpitations--PVC's benign on BB and mild MVP, nl EF on ECHO  Due for colon screen  Smokes 1/2 ppd--wants to restart wellbutrin  No cp cough sob  wroks 4 10 hr days       Last OV  Last PSA up to 3.5 referred to  MD  bph per lilliana JAMES--had prostate biopsy--inflammation.  Had 4k test and then bx  Will f/u again in 3 mos for PSA check  Due for screening baseline colonoscopy  Has had runny nose with the weather -takes equate  1 janis /week smoer  C/o palpiations maybe monthly when laying on left side-lasts seconds to minutes  Has been off statin x 3 months     Patient Active Problem List    Diagnosis Date Noted    MVP (mitral valve prolapse) 03/12/2012    Essential hypertension, benign 02/15/2010    Depression with anxiety 02/15/2010    Cigarette smoker 02/15/2010    Fibromyalgia 02/15/2010    Concussion 02/15/2010    Dyslipidemia 02/15/2010     Current Outpatient Medications   Medication Sig Dispense Refill    atorvastatin (LIPITOR) 10 mg tablet TAKE 1 TABLET DAILY 90 Tab 3    amLODIPine-benazepril (LOTREL) 5-20 mg per capsule Take 1 Cap by mouth daily. 90 Cap 3    propranolol LA (INDERAL LA) 80 mg SR capsule Take 1 Cap by mouth daily. 180 Cap 3    buPROPion SR (WELLBUTRIN SR) 150 mg SR tablet Take 1 Tab by mouth two (2) times a day. 180 Tab 3    ibuprofen (MOTRIN) 200 mg tablet Take 200 mg by mouth every six (6) hours as needed for Pain.  Cetirizine (ZYRTEC) 10 mg cap Take 10 mg by mouth daily.        Allergies   Allergen Reactions    Niaspan [Niacin] Other (comments)     flushing      Lab Results   Component Value Date/Time    WBC 8.5 11/01/2019 09:46 AM    HGB 14.5 11/01/2019 09:46 AM    HCT 43.3 11/01/2019 09:46 AM    PLATELET 490 34/71/7759 09:46 AM    MCV 82 11/01/2019 09:46 AM     Lab Results   Component Value Date/Time    Glucose 88 11/01/2019 09:46 AM    LDL, calculated 100 (H) 11/01/2019 09:46 AM    Creatinine 0.98 11/01/2019 09:46 AM      Lab Results   Component Value Date/Time    Cholesterol, total 148 11/01/2019 09:46 AM    HDL Cholesterol 32 (L) 11/01/2019 09:46 AM    LDL, calculated 100 (H) 11/01/2019 09:46 AM    Triglyceride 80 11/01/2019 09:46 AM    CHOL/HDL Ratio 6.2 (H) 03/05/2010 08:58 AM     Lab Results   Component Value Date/Time    GFR est non-AA 85 11/01/2019 09:46 AM    GFR est AA 99 11/01/2019 09:46 AM    Creatinine 0.98 11/01/2019 09:46 AM    BUN 14 11/01/2019 09:46 AM    Sodium 141 11/01/2019 09:46 AM    Potassium 4.9 11/01/2019 09:46 AM    Chloride 102 11/01/2019 09:46 AM    CO2 23 11/01/2019 09:46 AM     Lab Results   Component Value Date/Time    Prostate Specific Ag 3.5 06/01/2018 09:41 AM    Prostate Specific Ag 2.4 06/02/2017 09:36 AM    Prostate Specific Ag 1.7 12/17/2015 11:31 AM        ROS    Physical Exam  Constitutional:       Appearance: Normal appearance. Neurological:      Mental Status: He is alert. ASSESSMENT and PLAN  Diagnoses and all orders for this visit:    1. Essential hypertension   Elevated home readings   Increase lotrel 5/40 mg every day   Increase propranolol 80 mg 2 every day   bp monitoring-to call or return of sbp >  140mmhg-discussed  2. Dyslipidemia   Work on diet and exercise  3. Smoker   1/2 ppd    contiinue wellbutrin   Quit date later this month  Other orders  -     propranolol LA (INDERAL LA) 80 mg SR capsule; Take 2 Caps by mouth daily. -     amLODIPine-benazepril (LOTREL) 5-40 mg per capsule; Take 1 Cap by mouth daily. Follow-up and Dispositions    · Return in about 6 months (around 11/1/2020) for htn hld smoking.

## 2020-05-01 NOTE — PATIENT INSTRUCTIONS
This is an established visit conducted via telemedicine. The patient has been instructed that this meets HIPAA criteria and acknowledges and agrees to this method of visitation. Tye Early [place] 
71/00/50 
6:64 AM 
 
Chief Complaint Patient presents with  Cholesterol Problem 6 month follow up  Hypertension 6 month follow up  Labs  
  mail orders map

## 2020-11-05 NOTE — PROGRESS NOTES
HISTORY OF PRESENT ILLNESS  Maria Fernanda Vizcaino is a 63 y.o. male. HPI     VV via telephone encounter x 21 minutes d/t covid 19 pandemic x21 minutes  An electronic signature was used to authenticate this note. F/u HTN dyslipidemia, smoker  Home BP-118/72  112-120/65-72  lotrel and propranolol were increased last OV  Smoker--intermittent  1pack/week    Saw Dr Dimitris Hayes for rising PSA and mild BPH-recent ov -PSa dropped. Prostate MRI negative. No medication    Overdue for colon screen    Last OV    wellbutrin was restarted last OV -chronic smoker  [employer] quit date on Memorial day  Home /93  Palpitations controlled on BB  Walks for exercise  Stable weight 19    Patient Active Problem List    Diagnosis Date Noted    MVP (mitral valve prolapse) 03/12/2012    Essential hypertension, benign 02/15/2010    Depression with anxiety 02/15/2010    Cigarette smoker 02/15/2010    Fibromyalgia 02/15/2010    Concussion 02/15/2010    Dyslipidemia 02/15/2010     Current Outpatient Medications   Medication Sig Dispense Refill    propranolol LA (INDERAL LA) 80 mg SR capsule Take 2 Caps by mouth daily. 180 Cap 3    amLODIPine-benazepril (LOTREL) 5-40 mg per capsule Take 1 Cap by mouth daily. 90 Cap 3    atorvastatin (LIPITOR) 10 mg tablet TAKE 1 TABLET DAILY 90 Tab 3    buPROPion SR (WELLBUTRIN SR) 150 mg SR tablet Take 1 Tab by mouth two (2) times a day. 180 Tab 3    ibuprofen (MOTRIN) 200 mg tablet Take 200 mg by mouth every six (6) hours as needed for Pain.  Cetirizine (ZYRTEC) 10 mg cap Take 10 mg by mouth daily.        Allergies   Allergen Reactions    Niaspan [Niacin] Other (comments)     flushing      Lab Results   Component Value Date/Time    WBC 8.5 11/01/2019 09:46 AM    HGB 14.5 11/01/2019 09:46 AM    HCT 43.3 11/01/2019 09:46 AM    PLATELET 987 43/41/2505 09:46 AM    MCV 82 11/01/2019 09:46 AM     Lab Results   Component Value Date/Time    Glucose 88 11/01/2019 09:46 AM    LDL, calculated 100 (H) 11/01/2019 09:46 AM    Creatinine 0.98 11/01/2019 09:46 AM      Lab Results   Component Value Date/Time    Cholesterol, total 148 11/01/2019 09:46 AM    HDL Cholesterol 32 (L) 11/01/2019 09:46 AM    LDL, calculated 100 (H) 11/01/2019 09:46 AM    Triglyceride 80 11/01/2019 09:46 AM    CHOL/HDL Ratio 6.2 (H) 03/05/2010 08:58 AM     Lab Results   Component Value Date/Time    GFR est non-AA 85 11/01/2019 09:46 AM    GFR est AA 99 11/01/2019 09:46 AM    Creatinine 0.98 11/01/2019 09:46 AM    BUN 14 11/01/2019 09:46 AM    Sodium 141 11/01/2019 09:46 AM    Potassium 4.9 11/01/2019 09:46 AM    Chloride 102 11/01/2019 09:46 AM    CO2 23 11/01/2019 09:46 AM        ROS    Physical Exam    ASSESSMENT and PLAN  Diagnoses and all orders for this visit:    1. Essential hypertension  -     CBC W/O DIFF  -     METABOLIC PANEL, COMPREHENSIVE   Controlled per home readings  2. Dyslipidemia  -     METABOLIC PANEL, COMPREHENSIVE  -     LIPID PANEL   LDL at goal on statin  3. Smoker   Advised cessation  4. Prostate cancer screening   psa and exam per Dr Lizeth Vela    5.  Preventive   Advised flu shot , pneumovax 23

## 2020-11-06 ENCOUNTER — VIRTUAL VISIT (OUTPATIENT)
Dept: INTERNAL MEDICINE CLINIC | Age: 60
End: 2020-11-06

## 2020-11-06 DIAGNOSIS — E78.5 DYSLIPIDEMIA: ICD-10-CM

## 2020-11-06 DIAGNOSIS — Z12.5 PROSTATE CANCER SCREENING: ICD-10-CM

## 2020-11-06 DIAGNOSIS — F17.200 SMOKER: ICD-10-CM

## 2020-11-06 DIAGNOSIS — I10 ESSENTIAL HYPERTENSION: Primary | ICD-10-CM

## 2020-11-06 PROCEDURE — 99443 PR PHYS/QHP TELEPHONE EVALUATION 21-30 MIN: CPT | Performed by: INTERNAL MEDICINE

## 2020-11-06 NOTE — PATIENT INSTRUCTIONS
1. Have you been to the ER, urgent care clinic since your last visit? Hospitalized since your last visit? No 
 
2. Have you seen or consulted any other health care providers outside of the [address] [place] [address] since your last visit? Include any pap smears or colon screening. No 
  
 
This is an established visit conducted via telemedicine. The patient has been instructed that this meets HIPAA criteria and acknowledges and agrees to this method of visitation.  
 
Lori Randle [place] 
[birth year] 
1:60 AM

## 2021-05-14 RX ORDER — AMLODIPINE AND BENAZEPRIL HYDROCHLORIDE 5; 40 MG/1; MG/1
CAPSULE ORAL
Qty: 90 CAP | Refills: 3 | Status: SHIPPED | OUTPATIENT
Start: 2021-05-14 | End: 2022-05-16

## 2021-05-14 RX ORDER — PROPRANOLOL HYDROCHLORIDE 80 MG/1
CAPSULE, EXTENDED RELEASE ORAL
Qty: 180 CAP | Refills: 3 | Status: SHIPPED | OUTPATIENT
Start: 2021-05-14 | End: 2022-05-16

## 2021-05-14 RX ORDER — ATORVASTATIN CALCIUM 10 MG/1
TABLET, FILM COATED ORAL
Qty: 90 TAB | Refills: 3 | Status: SHIPPED | OUTPATIENT
Start: 2021-05-14 | End: 2022-05-16

## 2021-06-04 ENCOUNTER — OFFICE VISIT (OUTPATIENT)
Dept: INTERNAL MEDICINE CLINIC | Age: 61
End: 2021-06-04
Payer: COMMERCIAL

## 2021-06-04 VITALS
DIASTOLIC BLOOD PRESSURE: 66 MMHG | TEMPERATURE: 97.2 F | OXYGEN SATURATION: 100 % | HEART RATE: 57 BPM | RESPIRATION RATE: 16 BRPM | WEIGHT: 188 LBS | HEIGHT: 70 IN | BODY MASS INDEX: 26.92 KG/M2 | SYSTOLIC BLOOD PRESSURE: 119 MMHG

## 2021-06-04 DIAGNOSIS — Z00.00 ROUTINE GENERAL MEDICAL EXAMINATION AT A HEALTH CARE FACILITY: ICD-10-CM

## 2021-06-04 DIAGNOSIS — I49.3 PVC (PREMATURE VENTRICULAR CONTRACTION): ICD-10-CM

## 2021-06-04 DIAGNOSIS — Z23 ENCOUNTER FOR IMMUNIZATION: Primary | ICD-10-CM

## 2021-06-04 DIAGNOSIS — M54.31 RIGHT SIDED SCIATICA: ICD-10-CM

## 2021-06-04 DIAGNOSIS — E78.00 PURE HYPERCHOLESTEROLEMIA: ICD-10-CM

## 2021-06-04 DIAGNOSIS — I10 ESSENTIAL HYPERTENSION: ICD-10-CM

## 2021-06-04 PROBLEM — L40.9 PSORIASIS: Status: ACTIVE | Noted: 2021-06-04

## 2021-06-04 LAB
ALBUMIN SERPL-MCNC: 3.9 G/DL (ref 3.5–5)
ALBUMIN/GLOB SERPL: 1.2 {RATIO} (ref 1.1–2.2)
ALP SERPL-CCNC: 92 U/L (ref 45–117)
ALT SERPL-CCNC: 28 U/L (ref 12–78)
ANION GAP SERPL CALC-SCNC: 1 MMOL/L (ref 5–15)
AST SERPL-CCNC: 13 U/L (ref 15–37)
BILIRUB SERPL-MCNC: 0.7 MG/DL (ref 0.2–1)
BUN SERPL-MCNC: 12 MG/DL (ref 6–20)
BUN/CREAT SERPL: 15 (ref 12–20)
CALCIUM SERPL-MCNC: 9.4 MG/DL (ref 8.5–10.1)
CHLORIDE SERPL-SCNC: 106 MMOL/L (ref 97–108)
CHOLEST SERPL-MCNC: 119 MG/DL
CO2 SERPL-SCNC: 31 MMOL/L (ref 21–32)
CREAT SERPL-MCNC: 0.81 MG/DL (ref 0.7–1.3)
ERYTHROCYTE [DISTWIDTH] IN BLOOD BY AUTOMATED COUNT: 13.7 % (ref 11.5–14.5)
GLOBULIN SER CALC-MCNC: 3.2 G/DL (ref 2–4)
GLUCOSE SERPL-MCNC: 84 MG/DL (ref 65–100)
HCT VFR BLD AUTO: 44.4 % (ref 36.6–50.3)
HDLC SERPL-MCNC: 38 MG/DL
HDLC SERPL: 3.1 {RATIO} (ref 0–5)
HGB BLD-MCNC: 13.4 G/DL (ref 12.1–17)
LDLC SERPL CALC-MCNC: 68.6 MG/DL (ref 0–100)
MCH RBC QN AUTO: 27.7 PG (ref 26–34)
MCHC RBC AUTO-ENTMCNC: 30.2 G/DL (ref 30–36.5)
MCV RBC AUTO: 91.9 FL (ref 80–99)
NRBC # BLD: 0 K/UL (ref 0–0.01)
NRBC BLD-RTO: 0 PER 100 WBC
PLATELET # BLD AUTO: 242 K/UL (ref 150–400)
PMV BLD AUTO: 11.1 FL (ref 8.9–12.9)
POTASSIUM SERPL-SCNC: 4.5 MMOL/L (ref 3.5–5.1)
PROT SERPL-MCNC: 7.1 G/DL (ref 6.4–8.2)
RBC # BLD AUTO: 4.83 M/UL (ref 4.1–5.7)
SODIUM SERPL-SCNC: 138 MMOL/L (ref 136–145)
TRIGL SERPL-MCNC: 62 MG/DL (ref ?–150)
TSH SERPL DL<=0.05 MIU/L-ACNC: 1.23 UIU/ML (ref 0.36–3.74)
VLDLC SERPL CALC-MCNC: 12.4 MG/DL
WBC # BLD AUTO: 7.8 K/UL (ref 4.1–11.1)

## 2021-06-04 PROCEDURE — 90732 PPSV23 VACC 2 YRS+ SUBQ/IM: CPT | Performed by: INTERNAL MEDICINE

## 2021-06-04 PROCEDURE — 90471 IMMUNIZATION ADMIN: CPT | Performed by: INTERNAL MEDICINE

## 2021-06-04 PROCEDURE — 99396 PREV VISIT EST AGE 40-64: CPT | Performed by: INTERNAL MEDICINE

## 2021-06-04 NOTE — PATIENT INSTRUCTIONS
Office Policies Phone calls/patient messages: Please allow up to 24 hours for someone in the office to contact you about your call or message. Be mindful your provider may be out of the office or your message may require further review. We encourage you to use [employer] for your messages as this is a faster, more efficient way to communicate with our office Medication Refills: 
         
Prescription medications require 48-72 business hours to process. We encourage you to use [employer] for your refills. For controlled medications: Please allow 72 business hours to process. Certain medications may require you to  a written prescription at our office. NO narcotic/controlled medications will be prescribed after 4pm Monday through Friday or on weekends Form/Paperwork Completion: 
         
Please note a $25 fee may incur for all paperwork for completed by our providers. We ask that you allow 7-10 business days. Pre-payment is due prior to picking up/faxing the completed form. You may also download your forms to [employer] to have your doctor print off.

## 2021-06-04 NOTE — PROGRESS NOTES
HISTORY OF PRESENT ILLNESS  Edilberto Vargas is a 62 y.o. male. HPI       F/u HTN dyslipidemia, smoker    Stopped wellbutrin 4-5 weeks ago for tobacco cessation--no cravings  Still smokes 1pk/week  Less stres and anxiety now  Weight down 6 lbs with diet and exercise  Sees CHARO JAMES for psoraisis of feet  Sees Dr Anna Hunt q g6 mos for BPH and elevated PSA  Hx PVC/PAC-rare palpitations --Saw Dr Marciano Nascimento in the past-no medications needed. Having difficulty with low back pain and right sciaitica with burning sensation for weeks to months-no leg weakness  Last OV      Last OV  Home BP-118/72  112-120/65-72  lotrel and propranolol were increased last OV  Smoker--intermittent  1pack/week     Saw Dr Anna Hunt for rising PSA and mild BPH-recent ov -PSa dropped. Prostate MRI negative. No medication     Overdue for colon screen       Patient Active Problem List    Diagnosis Date Noted    Psoriasis 06/04/2021    PVC (premature ventricular contraction) 06/04/2021    MVP (mitral valve prolapse) 03/12/2012    Essential hypertension, benign 02/15/2010    Depression with anxiety 02/15/2010    Cigarette smoker 02/15/2010    Fibromyalgia 02/15/2010    Concussion 02/15/2010    Dyslipidemia 02/15/2010     Current Outpatient Medications   Medication Sig Dispense Refill    amLODIPine-benazepril (LOTREL) 5-40 mg per capsule TAKE 1 CAPSULE DAILY 90 Cap 3    atorvastatin (LIPITOR) 10 mg tablet TAKE 1 TABLET DAILY 90 Tab 3    propranolol LA (INDERAL LA) 80 mg SR capsule TAKE 2 CAPSULES DAILY 180 Cap 3    Cetirizine (ZYRTEC) 10 mg cap Take 10 mg by mouth daily.  buPROPion SR (WELLBUTRIN SR) 150 mg SR tablet Take 1 Tab by mouth two (2) times a day. (Patient not taking: Reported on 6/4/2021) 180 Tab 3    ibuprofen (MOTRIN) 200 mg tablet Take 200 mg by mouth every six (6) hours as needed for Pain.        Allergies   Allergen Reactions    Niaspan [Niacin] Other (comments)     flushing     Social History     Tobacco Use    Smoking status: Light Tobacco Smoker     Packs/day: 0.50    Smokeless tobacco: Never Used   Substance Use Topics    Alcohol use: Yes     Comment: occasional      Lab Results   Component Value Date/Time    WBC 7.8 06/04/2021 12:51 PM    HGB 13.4 06/04/2021 12:51 PM    HCT 44.4 06/04/2021 12:51 PM    PLATELET 611 68/57/7699 12:51 PM    MCV 91.9 06/04/2021 12:51 PM     Lab Results   Component Value Date/Time    Glucose 84 06/04/2021 12:51 PM    LDL, calculated 68.6 06/04/2021 12:51 PM    Creatinine 0.81 06/04/2021 12:51 PM      Lab Results   Component Value Date/Time    Cholesterol, total 119 06/04/2021 12:51 PM    HDL Cholesterol 38 06/04/2021 12:51 PM    LDL, calculated 68.6 06/04/2021 12:51 PM    Triglyceride 62 06/04/2021 12:51 PM    CHOL/HDL Ratio 3.1 06/04/2021 12:51 PM     Lab Results   Component Value Date/Time    GFR est non-AA >60 06/04/2021 12:51 PM    GFR est AA >60 06/04/2021 12:51 PM    Creatinine 0.81 06/04/2021 12:51 PM    BUN 12 06/04/2021 12:51 PM    Sodium 138 06/04/2021 12:51 PM    Potassium 4.5 06/04/2021 12:51 PM    Chloride 106 06/04/2021 12:51 PM    CO2 31 06/04/2021 12:51 PM        Review of Systems   Constitutional: Negative for chills, fever, malaise/fatigue and weight loss. Eyes: Negative for blurred vision and double vision. Respiratory: Negative for cough and shortness of breath. Cardiovascular: Negative for chest pain and palpitations. Gastrointestinal: Negative for abdominal pain, blood in stool, constipation, diarrhea, melena, nausea and vomiting. Genitourinary: Negative for dysuria, frequency, hematuria and urgency. Musculoskeletal: Negative for back pain, falls, joint pain and myalgias. Neurological: Negative for dizziness, tremors and headaches. Physical Exam  Vitals and nursing note reviewed. Constitutional:       General: He is not in acute distress. Appearance: He is well-developed. Comments: Appears stated age   HENT:      Head: Normocephalic.       Right Ear: Tympanic membrane normal.      Left Ear: Tympanic membrane normal.      Nose: Nose normal.      Mouth/Throat:      Mouth: Mucous membranes are moist.   Eyes:      Pupils: Pupils are equal, round, and reactive to light. Cardiovascular:      Rate and Rhythm: Normal rate and regular rhythm. Heart sounds: Normal heart sounds. Pulmonary:      Effort: Pulmonary effort is normal.      Breath sounds: Normal breath sounds. Abdominal:      Palpations: Abdomen is soft. Musculoskeletal:         General: Normal range of motion. Cervical back: Normal range of motion. Comments: Neg SLR b/l   Skin:     General: Skin is warm. Neurological:      General: No focal deficit present. Mental Status: He is alert. Psychiatric:         Mood and Affect: Mood normal.         Behavior: Behavior normal.         Thought Content: Thought content normal.         Judgment: Judgment normal.         ASSESSMENT and PLAN  Diagnoses and all orders for this visit:    1. Encounter for immunization  -     PNEUMOCOCCAL POLYSACCHARIDE VACCINE, 23-VALENT, ADULT OR IMMUNOSUPPRESSED PT DOSE,    2. Routine general medical examination at a health care facility  -     CBC W/O DIFF; Future  -     METABOLIC PANEL, COMPREHENSIVE; Future  -     LIPID PANEL; Future  -     TSH 3RD GENERATION; Future   Continue healthy lifestyle but stop smoking-discussed  3. Essential hypertension   contrlled  4. Pure hypercholesterolemia   On statin  5. PVC (premature ventricular contraction)   asymtpomatic  6. Right sided sciatica  -     REFERRAL TO ORTHOPEDICS      Follow-up and Dispositions    · Return in about 1 year (around 6/4/2022) for CPE.

## 2022-03-19 PROBLEM — L40.9 PSORIASIS: Status: ACTIVE | Noted: 2021-06-04

## 2022-03-20 PROBLEM — I49.3 PVC (PREMATURE VENTRICULAR CONTRACTION): Status: ACTIVE | Noted: 2021-06-04

## 2022-06-02 ENCOUNTER — DOCUMENTATION ONLY (OUTPATIENT)
Dept: INTERNAL MEDICINE CLINIC | Age: 62
End: 2022-06-02

## 2022-10-07 ENCOUNTER — TELEPHONE (OUTPATIENT)
Dept: INTERNAL MEDICINE CLINIC | Age: 62
End: 2022-10-07

## 2022-10-07 NOTE — TELEPHONE ENCOUNTER
----- Message from Dietrich Gottron sent at 10/7/2022  4:06 PM EDT -----  Subject: Appointment Request    Reason for Call: Established Patient Appointment needed: Routine Physical   Exam    QUESTIONS    Reason for appointment request? No appointments available during search     Additional Information for Provider? Patient called to schedule an annual   physical. He prefers to come on a Friday when he is off work. When would   the doctor be able to fit him in?  Please advise.  ---------------------------------------------------------------------------  --------------  Rajendra Singh AMYANNICK  [phone]; OK to leave message on voicemail  ---------------------------------------------------------------------------  --------------  SCRIPT ANSWERS  LAMBERTO Screen: Alexandra Diaz

## 2022-11-04 ENCOUNTER — OFFICE VISIT (OUTPATIENT)
Dept: INTERNAL MEDICINE CLINIC | Age: 62
End: 2022-11-04
Payer: COMMERCIAL

## 2022-11-04 VITALS
OXYGEN SATURATION: 100 % | DIASTOLIC BLOOD PRESSURE: 70 MMHG | HEIGHT: 69 IN | WEIGHT: 189 LBS | BODY MASS INDEX: 27.99 KG/M2 | SYSTOLIC BLOOD PRESSURE: 120 MMHG | HEART RATE: 87 BPM | TEMPERATURE: 98.2 F | RESPIRATION RATE: 16 BRPM

## 2022-11-04 DIAGNOSIS — Z23 ENCOUNTER FOR IMMUNIZATION: Primary | ICD-10-CM

## 2022-11-04 DIAGNOSIS — F17.200 SMOKER: ICD-10-CM

## 2022-11-04 DIAGNOSIS — I10 HYPERTENSION, UNSPECIFIED TYPE: ICD-10-CM

## 2022-11-04 DIAGNOSIS — E78.00 PURE HYPERCHOLESTEROLEMIA: ICD-10-CM

## 2022-11-04 DIAGNOSIS — Z00.00 ROUTINE GENERAL MEDICAL EXAMINATION AT A HEALTH CARE FACILITY: ICD-10-CM

## 2022-11-04 PROCEDURE — 90471 IMMUNIZATION ADMIN: CPT | Performed by: INTERNAL MEDICINE

## 2022-11-04 PROCEDURE — 99396 PREV VISIT EST AGE 40-64: CPT | Performed by: INTERNAL MEDICINE

## 2022-11-04 PROCEDURE — 90750 HZV VACC RECOMBINANT IM: CPT | Performed by: INTERNAL MEDICINE

## 2022-11-04 RX ORDER — PROPRANOLOL HYDROCHLORIDE 80 MG/1
80 CAPSULE, EXTENDED RELEASE ORAL 2 TIMES DAILY
Qty: 180 CAPSULE | Refills: 3 | Status: SHIPPED | OUTPATIENT
Start: 2022-11-04

## 2022-11-04 RX ORDER — AMLODIPINE AND BENAZEPRIL HYDROCHLORIDE 5; 40 MG/1; MG/1
1 CAPSULE ORAL DAILY
Qty: 90 CAPSULE | Refills: 3 | Status: SHIPPED | OUTPATIENT
Start: 2022-11-04

## 2022-11-04 RX ORDER — ATORVASTATIN CALCIUM 10 MG/1
10 TABLET, FILM COATED ORAL DAILY
Qty: 90 TABLET | Refills: 3 | Status: SHIPPED | OUTPATIENT
Start: 2022-11-04

## 2022-11-04 RX ORDER — BUPROPION HYDROCHLORIDE 150 MG/1
150 TABLET, EXTENDED RELEASE ORAL 2 TIMES DAILY
Qty: 180 TABLET | Refills: 3 | Status: SHIPPED | OUTPATIENT
Start: 2022-11-04

## 2022-11-04 NOTE — PROGRESS NOTES
Randy Seip is a 62 y.o. male who presents for routine immunizations. He denies any symptoms , reactions or allergies that would exclude them from being immunized today. Risks and adverse reactions were discussed and the VIS was given to them. All questions were addressed. He was observed for 15 min post injection. There were no reactions observed. Joaquim Eaton LPN      1. \"Have you been to the ER, urgent care clinic since your last visit? Hospitalized since your last visit? \" No    2. \"Have you seen or consulted any other health care providers outside of the [address] [place] [address] since your last visit? \" No     3. For patients aged 39-70: Has the patient had a colonoscopy / FIT/ Cologuard?  No

## 2022-11-04 NOTE — PROGRESS NOTES
HISTORY OF PRESENT ILLNESS  Rosi Dunbar is a 62 y.o. male. HPI    HISTORY OF PRESENT ILLNESS  Rosi Dunbar is a 62 y.o. male. HPI   F/u HTN dyslipidemia, smoker  BPHand CPE  Fbiilm-iuxtbumq-ttmqv to retry wellbutrin which worked well in [facility]    Active at work, hikes , exercises at home  No cp sob  Feels well overall  See URO MD for BPH and psa checks-will see Dr Alexandr Houston in January. Neg bx and mri-no medicines needed for BPH  Last OV     Stopped wellbutrin 4-5 weeks ago for tobacco cessation--no cravings  Still smokes 1pk/week  Less stres sand anxiety now  Weight down 6 lbs with diet and exercise  Sees DERM MD for psoraisis of feet  Sees Dr Alexandr Houston q g6 mos for BPH and elevated PSA  Hx PVC/PAC-rare palpitations --Saw Dr Donovan Lucas in the past-no medications needed. Having difficulty with low back pain and right sciaitica with burning sensation for weeks to months-no leg weakness    Patient Active Problem List    Diagnosis Date Noted    Psoriasis 06/04/2021    PVC (premature ventricular contraction) 06/04/2021    MVP (mitral valve prolapse) 03/12/2012    Essential hypertension, benign 02/15/2010    Depression with anxiety 02/15/2010    Cigarette smoker 02/15/2010    Fibromyalgia 02/15/2010    Concussion 02/15/2010    Dyslipidemia 02/15/2010     Current Outpatient Medications   Medication Sig Dispense Refill    buPROPion SR (WELLBUTRIN SR) 150 mg SR tablet Take 1 Tablet by mouth two (2) times a day. 180 Tablet 3    amLODIPine-benazepril (LOTREL) 5-40 mg per capsule Take 1 Capsule by mouth daily. 90 Capsule 3    atorvastatin (LIPITOR) 10 mg tablet Take 1 Tablet by mouth daily. 90 Tablet 3    propranolol LA (INDERAL LA) 80 mg SR capsule Take 1 Capsule by mouth two (2) times a day. 180 Capsule 3    ibuprofen (MOTRIN) 200 mg tablet Take 200 mg by mouth every six (6) hours as needed for Pain. Cetirizine 10 mg cap Take 10 mg by mouth daily.        Allergies   Allergen Reactions    Niaspan [Niacin] Other (comments)     flushing      Lab Results   Component Value Date/Time    WBC 7.8 06/04/2021 12:51 PM    HGB 13.4 06/04/2021 12:51 PM    HCT 44.4 06/04/2021 12:51 PM    PLATELET 831 56/29/8721 12:51 PM    MCV 91.9 06/04/2021 12:51 PM     Lab Results   Component Value Date/Time    Glucose 84 06/04/2021 12:51 PM    LDL, calculated 68.6 06/04/2021 12:51 PM    Creatinine 0.81 06/04/2021 12:51 PM      Lab Results   Component Value Date/Time    Cholesterol, total 119 06/04/2021 12:51 PM    HDL Cholesterol 38 06/04/2021 12:51 PM    LDL, calculated 68.6 06/04/2021 12:51 PM    Triglyceride 62 06/04/2021 12:51 PM    CHOL/HDL Ratio 3.1 06/04/2021 12:51 PM     Lab Results   Component Value Date/Time    GFR est non-AA >60 06/04/2021 12:51 PM    GFR est AA >60 06/04/2021 12:51 PM    Creatinine 0.81 06/04/2021 12:51 PM    BUN 12 06/04/2021 12:51 PM    Sodium 138 06/04/2021 12:51 PM    Potassium 4.5 06/04/2021 12:51 PM    Chloride 106 06/04/2021 12:51 PM    CO2 31 06/04/2021 12:51 PM     Lab Results   Component Value Date/Time    TSH 1.23 06/04/2021 12:51 PM      Lab Results   Component Value Date/Time    Glucose 84 06/04/2021 12:51 PM         Review of Systems   Constitutional:  Negative for chills, fever, malaise/fatigue and weight loss. HENT: Negative. Eyes:  Negative for blurred vision and double vision. Respiratory:  Negative for cough and shortness of breath. Cardiovascular:  Negative for chest pain and palpitations. Gastrointestinal:  Negative for abdominal pain, blood in stool, constipation, diarrhea, melena, nausea and vomiting. Genitourinary:  Negative for dysuria, frequency, hematuria and urgency. Musculoskeletal:  Negative for back pain, falls, joint pain and myalgias. Skin: Negative. Neurological:  Negative for dizziness, tremors and headaches. Physical Exam  Vitals and nursing note reviewed. Constitutional:       General: He is not in acute distress. Appearance: Normal appearance. He is well-developed. HENT:      Head: Normocephalic. Right Ear: Tympanic membrane normal.      Left Ear: Tympanic membrane normal.      Mouth/Throat:      Mouth: Mucous membranes are moist.   Eyes:      Pupils: Pupils are equal, round, and reactive to light. Neck:      Vascular: No carotid bruit. Cardiovascular:      Rate and Rhythm: Normal rate and regular rhythm. Heart sounds: Normal heart sounds. Pulmonary:      Effort: Pulmonary effort is normal.      Breath sounds: Normal breath sounds. Abdominal:      Palpations: Abdomen is soft. Musculoskeletal:      Cervical back: Normal range of motion. Right lower leg: No edema. Left lower leg: No edema. Lymphadenopathy:      Cervical: No cervical adenopathy. Neurological:      General: No focal deficit present. Mental Status: He is alert. ASSESSMENT and PLAN    ICD-10-CM ICD-9-CM    1. Encounter for immunization  Z23 V03.89 ZOSTER, SHINGRIX, (18 YRS +), IM      2. Routine general medical examination at a health care facility  Z00.00 V70.0 REFERRAL TO GASTROENTEROLOGY-colonosocpy      CBC W/O DIFF      METABOLIC PANEL, COMPREHENSIVE      LIPID PANEL      TSH 3RD GENERATION      URINALYSIS W/ REFLEX CULTURE      URINALYSIS W/ REFLEX CULTURE      TSH 3RD GENERATION      LIPID PANEL      METABOLIC PANEL, COMPREHENSIVE      CBC W/O DIFF  1st shingrix today--repeat in 2-6 months  Recommended covid booster      3. Hypertension, unspecified type  I10 401.9 controlled      4. Pure hypercholesterolemia  E78.00 272.0 Refill lipitor      5.  Smoker  F17.200 305.1 Rx wellbutrin   Rtc 1 year CPE

## 2022-11-04 NOTE — PATIENT INSTRUCTIONS
Office Policies    Phone calls/patient messages:            Please allow up to 24 hours for someone in the office to contact you about your call or message. Be mindful your provider may be out of the office or your message may require further review. We encourage you to use [employer] for your messages as this is a faster, more efficient way to communicate with our office                         Medication Refills:            Prescription medications require 48-72 business hours to process. We encourage you to use [employer] for your refills. For controlled medications: Please allow 72 business hours to process. Certain medications may require you to  a written prescription at our office. NO narcotic/controlled medications will be prescribed after 4pm Monday through Friday or on weekends              Form/Paperwork Completion:            Please note a $25 fee may incur for all paperwork for completed by our providers. We ask that you allow 7-10 business days. Pre-payment is due prior to picking up/faxing the completed form. You may also download your forms to [employer] to have your doctor print off.

## 2022-11-04 NOTE — TELEPHONE ENCOUNTER
Future Appointments:  No future appointments. Last Appointment With Me:  11/4/2022     Requested Prescriptions     Pending Prescriptions Disp Refills    amLODIPine-benazepril (LOTREL) 5-40 mg per capsule 90 Capsule 3     Sig: Take 1 Capsule by mouth daily. atorvastatin (LIPITOR) 10 mg tablet 90 Tablet 3     Sig: Take 1 Tablet by mouth daily. propranolol LA (INDERAL LA) 80 mg SR capsule 180 Capsule 3     Sig: Take 1 Capsule by mouth two (2) times a day.

## 2022-11-05 LAB
ALBUMIN SERPL-MCNC: 4.1 G/DL (ref 3.5–5)
ALBUMIN/GLOB SERPL: 1.1 {RATIO} (ref 1.1–2.2)
ALP SERPL-CCNC: 92 U/L (ref 45–117)
ALT SERPL-CCNC: 28 U/L (ref 12–78)
ANION GAP SERPL CALC-SCNC: 3 MMOL/L (ref 5–15)
APPEARANCE UR: CLEAR
AST SERPL-CCNC: 13 U/L (ref 15–37)
BACTERIA URNS QL MICRO: NEGATIVE /HPF
BILIRUB SERPL-MCNC: 0.5 MG/DL (ref 0.2–1)
BILIRUB UR QL: NEGATIVE
BUN SERPL-MCNC: 15 MG/DL (ref 6–20)
BUN/CREAT SERPL: 17 (ref 12–20)
CALCIUM SERPL-MCNC: 9.4 MG/DL (ref 8.5–10.1)
CHLORIDE SERPL-SCNC: 105 MMOL/L (ref 97–108)
CHOLEST SERPL-MCNC: 172 MG/DL
CO2 SERPL-SCNC: 29 MMOL/L (ref 21–32)
COLOR UR: NORMAL
CREAT SERPL-MCNC: 0.9 MG/DL (ref 0.7–1.3)
EPITH CASTS URNS QL MICRO: NORMAL /LPF
ERYTHROCYTE [DISTWIDTH] IN BLOOD BY AUTOMATED COUNT: 14 % (ref 11.5–14.5)
GLOBULIN SER CALC-MCNC: 3.7 G/DL (ref 2–4)
GLUCOSE SERPL-MCNC: 88 MG/DL (ref 65–100)
GLUCOSE UR STRIP.AUTO-MCNC: NEGATIVE MG/DL
HCT VFR BLD AUTO: 47.5 % (ref 36.6–50.3)
HDLC SERPL-MCNC: 42 MG/DL
HDLC SERPL: 4.1 {RATIO} (ref 0–5)
HGB BLD-MCNC: 14.5 G/DL (ref 12.1–17)
HGB UR QL STRIP: NEGATIVE
HYALINE CASTS URNS QL MICRO: NORMAL /LPF (ref 0–5)
KETONES UR QL STRIP.AUTO: NEGATIVE MG/DL
LDLC SERPL CALC-MCNC: 111.2 MG/DL (ref 0–100)
LEUKOCYTE ESTERASE UR QL STRIP.AUTO: NEGATIVE
MCH RBC QN AUTO: 28 PG (ref 26–34)
MCHC RBC AUTO-ENTMCNC: 30.5 G/DL (ref 30–36.5)
MCV RBC AUTO: 91.7 FL (ref 80–99)
NITRITE UR QL STRIP.AUTO: NEGATIVE
NRBC # BLD: 0 K/UL (ref 0–0.01)
NRBC BLD-RTO: 0 PER 100 WBC
PH UR STRIP: 6.5 [PH] (ref 5–8)
PLATELET # BLD AUTO: 295 K/UL (ref 150–400)
PMV BLD AUTO: 10.6 FL (ref 8.9–12.9)
POTASSIUM SERPL-SCNC: 4.9 MMOL/L (ref 3.5–5.1)
PROT SERPL-MCNC: 7.8 G/DL (ref 6.4–8.2)
PROT UR STRIP-MCNC: NEGATIVE MG/DL
RBC # BLD AUTO: 5.18 M/UL (ref 4.1–5.7)
RBC #/AREA URNS HPF: NORMAL /HPF (ref 0–5)
SODIUM SERPL-SCNC: 137 MMOL/L (ref 136–145)
SP GR UR REFRACTOMETRY: 1.01 (ref 1–1.03)
TRIGL SERPL-MCNC: 94 MG/DL (ref ?–150)
TSH SERPL DL<=0.05 MIU/L-ACNC: 1.42 UIU/ML (ref 0.36–3.74)
UA: UC IF INDICATED,UAUC: NORMAL
UROBILINOGEN UR QL STRIP.AUTO: 0.2 EU/DL (ref 0.2–1)
VLDLC SERPL CALC-MCNC: 18.8 MG/DL
WBC # BLD AUTO: 12.4 K/UL (ref 4.1–11.1)
WBC URNS QL MICRO: NORMAL /HPF (ref 0–4)

## 2024-03-08 RX ORDER — AMLODIPINE AND BENAZEPRIL HYDROCHLORIDE 5; 40 MG/1; MG/1
1 CAPSULE ORAL DAILY
Qty: 90 CAPSULE | Refills: 3 | Status: SHIPPED | OUTPATIENT
Start: 2024-03-08

## 2024-03-08 RX ORDER — PROPRANOLOL HYDROCHLORIDE 80 MG/1
80 CAPSULE, EXTENDED RELEASE ORAL 2 TIMES DAILY
Qty: 180 CAPSULE | Refills: 3 | Status: SHIPPED | OUTPATIENT
Start: 2024-03-08

## 2024-05-10 ENCOUNTER — OFFICE VISIT (OUTPATIENT)
Age: 64
End: 2024-05-10
Payer: COMMERCIAL

## 2024-05-10 VITALS
DIASTOLIC BLOOD PRESSURE: 64 MMHG | OXYGEN SATURATION: 99 % | BODY MASS INDEX: 27.34 KG/M2 | WEIGHT: 184.6 LBS | HEIGHT: 69 IN | TEMPERATURE: 97 F | RESPIRATION RATE: 16 BRPM | SYSTOLIC BLOOD PRESSURE: 110 MMHG | HEART RATE: 79 BPM

## 2024-05-10 DIAGNOSIS — E78.5 HYPERLIPIDEMIA, UNSPECIFIED HYPERLIPIDEMIA TYPE: ICD-10-CM

## 2024-05-10 DIAGNOSIS — I10 HYPERTENSION, UNSPECIFIED TYPE: ICD-10-CM

## 2024-05-10 DIAGNOSIS — N40.1 BENIGN PROSTATIC HYPERPLASIA WITH LOWER URINARY TRACT SYMPTOMS, SYMPTOM DETAILS UNSPECIFIED: ICD-10-CM

## 2024-05-10 DIAGNOSIS — Z00.00 ROUTINE PHYSICAL EXAMINATION: Primary | ICD-10-CM

## 2024-05-10 DIAGNOSIS — F17.200 SMOKER: ICD-10-CM

## 2024-05-10 DIAGNOSIS — R00.2 PALPITATION: ICD-10-CM

## 2024-05-10 DIAGNOSIS — J40 BRONCHITIS: ICD-10-CM

## 2024-05-10 PROCEDURE — 99396 PREV VISIT EST AGE 40-64: CPT | Performed by: INTERNAL MEDICINE

## 2024-05-10 PROCEDURE — 3074F SYST BP LT 130 MM HG: CPT | Performed by: INTERNAL MEDICINE

## 2024-05-10 PROCEDURE — 90471 IMMUNIZATION ADMIN: CPT | Performed by: INTERNAL MEDICINE

## 2024-05-10 PROCEDURE — 3078F DIAST BP <80 MM HG: CPT | Performed by: INTERNAL MEDICINE

## 2024-05-10 PROCEDURE — 90715 TDAP VACCINE 7 YRS/> IM: CPT | Performed by: INTERNAL MEDICINE

## 2024-05-10 RX ORDER — ATORVASTATIN CALCIUM 10 MG/1
10 TABLET, FILM COATED ORAL DAILY
Qty: 90 TABLET | Refills: 3 | Status: SHIPPED | OUTPATIENT
Start: 2024-05-10

## 2024-05-10 RX ORDER — AZITHROMYCIN 250 MG/1
TABLET, FILM COATED ORAL
Qty: 6 TABLET | Refills: 0 | Status: SHIPPED | OUTPATIENT
Start: 2024-05-10 | End: 2024-05-10 | Stop reason: SDUPTHER

## 2024-05-10 RX ORDER — AZITHROMYCIN 250 MG/1
TABLET, FILM COATED ORAL
Qty: 6 TABLET | Refills: 0 | Status: SHIPPED | OUTPATIENT
Start: 2024-05-10 | End: 2024-05-20

## 2024-05-10 RX ORDER — SILODOSIN 4 MG/1
4 CAPSULE ORAL EVERY EVENING
COMMUNITY

## 2024-05-10 SDOH — ECONOMIC STABILITY: HOUSING INSECURITY
IN THE LAST 12 MONTHS, WAS THERE A TIME WHEN YOU DID NOT HAVE A STEADY PLACE TO SLEEP OR SLEPT IN A SHELTER (INCLUDING NOW)?: NO

## 2024-05-10 SDOH — ECONOMIC STABILITY: INCOME INSECURITY: HOW HARD IS IT FOR YOU TO PAY FOR THE VERY BASICS LIKE FOOD, HOUSING, MEDICAL CARE, AND HEATING?: NOT HARD AT ALL

## 2024-05-10 SDOH — ECONOMIC STABILITY: FOOD INSECURITY: WITHIN THE PAST 12 MONTHS, YOU WORRIED THAT YOUR FOOD WOULD RUN OUT BEFORE YOU GOT MONEY TO BUY MORE.: NEVER TRUE

## 2024-05-10 SDOH — ECONOMIC STABILITY: FOOD INSECURITY: WITHIN THE PAST 12 MONTHS, THE FOOD YOU BOUGHT JUST DIDN'T LAST AND YOU DIDN'T HAVE MONEY TO GET MORE.: NEVER TRUE

## 2024-05-10 ASSESSMENT — PATIENT HEALTH QUESTIONNAIRE - PHQ9
3. TROUBLE FALLING OR STAYING ASLEEP: SEVERAL DAYS
2. FEELING DOWN, DEPRESSED OR HOPELESS: NOT AT ALL
SUM OF ALL RESPONSES TO PHQ QUESTIONS 1-9: 4
SUM OF ALL RESPONSES TO PHQ QUESTIONS 1-9: 4
8. MOVING OR SPEAKING SO SLOWLY THAT OTHER PEOPLE COULD HAVE NOTICED. OR THE OPPOSITE, BEING SO FIGETY OR RESTLESS THAT YOU HAVE BEEN MOVING AROUND A LOT MORE THAN USUAL: NOT AT ALL
4. FEELING TIRED OR HAVING LITTLE ENERGY: NEARLY EVERY DAY
5. POOR APPETITE OR OVEREATING: NOT AT ALL
1. LITTLE INTEREST OR PLEASURE IN DOING THINGS: NOT AT ALL
6. FEELING BAD ABOUT YOURSELF - OR THAT YOU ARE A FAILURE OR HAVE LET YOURSELF OR YOUR FAMILY DOWN: NOT AT ALL
10. IF YOU CHECKED OFF ANY PROBLEMS, HOW DIFFICULT HAVE THESE PROBLEMS MADE IT FOR YOU TO DO YOUR WORK, TAKE CARE OF THINGS AT HOME, OR GET ALONG WITH OTHER PEOPLE: SOMEWHAT DIFFICULT
SUM OF ALL RESPONSES TO PHQ9 QUESTIONS 1 & 2: 0
SUM OF ALL RESPONSES TO PHQ QUESTIONS 1-9: 4
SUM OF ALL RESPONSES TO PHQ QUESTIONS 1-9: 4
9. THOUGHTS THAT YOU WOULD BE BETTER OFF DEAD, OR OF HURTING YOURSELF: NOT AT ALL
7. TROUBLE CONCENTRATING ON THINGS, SUCH AS READING THE NEWSPAPER OR WATCHING TELEVISION: NOT AT ALL

## 2024-05-10 ASSESSMENT — ENCOUNTER SYMPTOMS
EYES NEGATIVE: 1
RESPIRATORY NEGATIVE: 1
GASTROINTESTINAL NEGATIVE: 1
ALLERGIC/IMMUNOLOGIC NEGATIVE: 1

## 2024-05-10 NOTE — PROGRESS NOTES
HISTORY OF PRESENT ILLNESS   Peter Capone   is a 63 y.o.  male.  F/u HTN dyslipidemia, smoker  BPH, MVP/MRand CPE    He lost his father last Summer in [place]  Lost 5 lbs    Working on tobacco cessation    On rapaflo for BPH--inconclusive MRI---latest  down to 4.7. had a prior neg prostate bx  Off wellbutrin which did not help that much for tobacco cravings  Had colonoscopy 2 months ago Dr Ha-repeat 10 yrs    Has sicatiac pain and pins and needles right leg  Able to exercise on exercise bike and generally feels well    Last OV  Ccohkj-qiniwvds-owwwc to retry wellbutrin which worked well in [facility]    Active at work, hikes , exercises at home  No cp sob  Feels well overall  See URO MD for BPH and psa checks-will see Dr De La Vega in January. Neg bx and mri-no medicines needed for BPH  Patient Active Problem List    Diagnosis Date Noted    Psoriasis 06/04/2021    PVC (premature ventricular contraction) 06/04/2021    MVP (mitral valve prolapse) 03/12/2012    Dyslipidemia 02/15/2010    Concussion 02/15/2010    Essential hypertension, benign 02/15/2010    Fibromyalgia 02/15/2010    Cigarette smoker 02/15/2010    Depression with anxiety 02/15/2010     Current Outpatient Medications   Medication Sig Dispense Refill    silodosin (RAPAFLO) 4 MG CAPS capsule Take 1 capsule by mouth every evening      propranolol (INDERAL LA) 80 MG extended release capsule TAKE 1 CAPSULE TWICE A DAY (Patient taking differently: Take 1 capsule by mouth daily) 180 capsule 3    amLODIPine-benazepril (LOTREL) 5-40 MG per capsule TAKE 1 CAPSULE DAILY 90 capsule 3    Cetirizine HCl 10 MG CAPS Take by mouth daily      ibuprofen (ADVIL;MOTRIN) 200 MG tablet Take by mouth every 6 hours as needed      propranolol (INDERAL XL) 80 MG extended release capsule Take by mouth 2 times daily      atorvastatin (LIPITOR) 10 MG tablet Take by mouth daily (Patient not taking: Reported on 5/10/2024)      buPROPion (WELLBUTRIN SR) 150 MG extended

## 2024-05-10 NOTE — PROGRESS NOTES
\"Have you been to the ER, urgent care clinic since your last visit?  Hospitalized since your last visit?\"    NO    “Have you seen or consulted any other health care providers outside of [facility] since your last visit?”    [facility]         “Have you had a colorectal cancer screening such as a colonoscopy/FIT/Cologuard?    2024    No colonoscopy on file  No cologuard on file  No FIT/FOBT on file   No flexible sigmoidoscopy on file         Click Here for Release of Records Request

## 2024-12-16 NOTE — PROGRESS NOTES
Left message for pt to call about lab results Upon review of the In Basket request we were able to note that no further action is required. The patient chart is up to date as a result of a previous request.      Any additional questions or concerns should be emailed to the Practice Liaisons via the appropriate education email address, please do not reply via In Basket.    Thank you  Best Yang MA   [facility]

## 2025-05-14 ASSESSMENT — ENCOUNTER SYMPTOMS
EYES NEGATIVE: 1
RESPIRATORY NEGATIVE: 1
ALLERGIC/IMMUNOLOGIC NEGATIVE: 1
GASTROINTESTINAL NEGATIVE: 1

## 2025-05-16 ENCOUNTER — RESULTS FOLLOW-UP (OUTPATIENT)
Age: 65
End: 2025-05-16

## 2025-05-16 ENCOUNTER — OFFICE VISIT (OUTPATIENT)
Age: 65
End: 2025-05-16

## 2025-05-16 VITALS
SYSTOLIC BLOOD PRESSURE: 132 MMHG | BODY MASS INDEX: 28.5 KG/M2 | OXYGEN SATURATION: 98 % | RESPIRATION RATE: 16 BRPM | HEART RATE: 74 BPM | DIASTOLIC BLOOD PRESSURE: 80 MMHG | TEMPERATURE: 97.5 F | HEIGHT: 69 IN | WEIGHT: 192.4 LBS

## 2025-05-16 DIAGNOSIS — R00.2 PALPITATIONS: ICD-10-CM

## 2025-05-16 DIAGNOSIS — I10 HYPERTENSION, UNSPECIFIED TYPE: Primary | ICD-10-CM

## 2025-05-16 DIAGNOSIS — N40.0 BENIGN PROSTATIC HYPERPLASIA, UNSPECIFIED WHETHER LOWER URINARY TRACT SYMPTOMS PRESENT: ICD-10-CM

## 2025-05-16 DIAGNOSIS — R97.20 ELEVATED PSA: ICD-10-CM

## 2025-05-16 DIAGNOSIS — E78.5 DYSLIPIDEMIA: ICD-10-CM

## 2025-05-16 DIAGNOSIS — Z00.00 ROUTINE PHYSICAL EXAMINATION: ICD-10-CM

## 2025-05-16 DIAGNOSIS — F17.200 SMOKER: ICD-10-CM

## 2025-05-16 LAB
ALBUMIN SERPL-MCNC: 3.8 G/DL (ref 3.5–5)
ALBUMIN/GLOB SERPL: 1.1 (ref 1.1–2.2)
ALP SERPL-CCNC: 94 U/L (ref 45–117)
ALT SERPL-CCNC: 32 U/L (ref 12–78)
ANION GAP SERPL CALC-SCNC: 3 MMOL/L (ref 2–12)
AST SERPL-CCNC: 11 U/L (ref 15–37)
BASOPHILS # BLD: 0.09 K/UL (ref 0–0.1)
BASOPHILS NFR BLD: 1 % (ref 0–1)
BILIRUB SERPL-MCNC: 0.6 MG/DL (ref 0.2–1)
BUN SERPL-MCNC: 14 MG/DL (ref 6–20)
BUN/CREAT SERPL: 16 (ref 12–20)
CALCIUM SERPL-MCNC: 9.7 MG/DL (ref 8.5–10.1)
CHLORIDE SERPL-SCNC: 105 MMOL/L (ref 97–108)
CHOLEST SERPL-MCNC: 128 MG/DL
CO2 SERPL-SCNC: 29 MMOL/L (ref 21–32)
CREAT SERPL-MCNC: 0.9 MG/DL (ref 0.7–1.3)
DIFFERENTIAL METHOD BLD: ABNORMAL
EOSINOPHIL # BLD: 0.2 K/UL (ref 0–0.4)
EOSINOPHIL NFR BLD: 2.2 % (ref 0–7)
ERYTHROCYTE [DISTWIDTH] IN BLOOD BY AUTOMATED COUNT: 13.5 % (ref 11.5–14.5)
GLOBULIN SER CALC-MCNC: 3.5 G/DL (ref 2–4)
GLUCOSE SERPL-MCNC: 97 MG/DL (ref 65–100)
HCT VFR BLD AUTO: 43 % (ref 36.6–50.3)
HDLC SERPL-MCNC: 35 MG/DL
HDLC SERPL: 3.7 (ref 0–5)
HGB BLD-MCNC: 13.8 G/DL (ref 12.1–17)
IMM GRANULOCYTES # BLD AUTO: 0.05 K/UL (ref 0–0.04)
IMM GRANULOCYTES NFR BLD AUTO: 0.5 % (ref 0–0.5)
LDLC SERPL CALC-MCNC: 76.8 MG/DL (ref 0–100)
LYMPHOCYTES # BLD: 2.47 K/UL (ref 0.8–3.5)
LYMPHOCYTES NFR BLD: 26.6 % (ref 12–49)
MCH RBC QN AUTO: 28.3 PG (ref 26–34)
MCHC RBC AUTO-ENTMCNC: 32.1 G/DL (ref 30–36.5)
MCV RBC AUTO: 88.1 FL (ref 80–99)
MONOCYTES # BLD: 0.75 K/UL (ref 0–1)
MONOCYTES NFR BLD: 8.1 % (ref 5–13)
NEUTS SEG # BLD: 5.74 K/UL (ref 1.8–8)
NEUTS SEG NFR BLD: 61.6 % (ref 32–75)
NRBC # BLD: 0 K/UL (ref 0–0.01)
NRBC BLD-RTO: 0 PER 100 WBC
PLATELET # BLD AUTO: 241 K/UL (ref 150–400)
PMV BLD AUTO: 10.9 FL (ref 8.9–12.9)
POTASSIUM SERPL-SCNC: 4.3 MMOL/L (ref 3.5–5.1)
PROT SERPL-MCNC: 7.3 G/DL (ref 6.4–8.2)
RBC # BLD AUTO: 4.88 M/UL (ref 4.1–5.7)
SODIUM SERPL-SCNC: 137 MMOL/L (ref 136–145)
TRIGL SERPL-MCNC: 81 MG/DL
TSH SERPL DL<=0.05 MIU/L-ACNC: 2.03 UIU/ML (ref 0.36–3.74)
VLDLC SERPL CALC-MCNC: 16.2 MG/DL
WBC # BLD AUTO: 9.3 K/UL (ref 4.1–11.1)

## 2025-05-16 RX ORDER — ATORVASTATIN CALCIUM 10 MG/1
10 TABLET, FILM COATED ORAL DAILY
Qty: 90 TABLET | Refills: 3 | Status: SHIPPED | OUTPATIENT
Start: 2025-05-16

## 2025-05-16 RX ORDER — BENAZEPRIL HYDROCHLORIDE 10 MG/1
10 TABLET ORAL DAILY
Qty: 30 TABLET | Refills: 3 | Status: SHIPPED | OUTPATIENT
Start: 2025-05-16

## 2025-05-16 RX ORDER — PROPRANOLOL HYDROCHLORIDE 80 MG/1
80 CAPSULE, EXTENDED RELEASE ORAL 2 TIMES DAILY
Qty: 180 CAPSULE | Refills: 3 | Status: SHIPPED | OUTPATIENT
Start: 2025-05-16

## 2025-05-16 SDOH — ECONOMIC STABILITY: FOOD INSECURITY: WITHIN THE PAST 12 MONTHS, YOU WORRIED THAT YOUR FOOD WOULD RUN OUT BEFORE YOU GOT MONEY TO BUY MORE.: NEVER TRUE

## 2025-05-16 SDOH — ECONOMIC STABILITY: FOOD INSECURITY: WITHIN THE PAST 12 MONTHS, THE FOOD YOU BOUGHT JUST DIDN'T LAST AND YOU DIDN'T HAVE MONEY TO GET MORE.: NEVER TRUE

## 2025-05-16 ASSESSMENT — PATIENT HEALTH QUESTIONNAIRE - PHQ9
9. THOUGHTS THAT YOU WOULD BE BETTER OFF DEAD, OR OF HURTING YOURSELF: NOT AT ALL
8. MOVING OR SPEAKING SO SLOWLY THAT OTHER PEOPLE COULD HAVE NOTICED. OR THE OPPOSITE, BEING SO FIGETY OR RESTLESS THAT YOU HAVE BEEN MOVING AROUND A LOT MORE THAN USUAL: NOT AT ALL
2. FEELING DOWN, DEPRESSED OR HOPELESS: NOT AT ALL
6. FEELING BAD ABOUT YOURSELF - OR THAT YOU ARE A FAILURE OR HAVE LET YOURSELF OR YOUR FAMILY DOWN: NOT AT ALL
SUM OF ALL RESPONSES TO PHQ QUESTIONS 1-9: 0
1. LITTLE INTEREST OR PLEASURE IN DOING THINGS: NOT AT ALL
4. FEELING TIRED OR HAVING LITTLE ENERGY: NOT AT ALL
SUM OF ALL RESPONSES TO PHQ QUESTIONS 1-9: 0
5. POOR APPETITE OR OVEREATING: NOT AT ALL
7. TROUBLE CONCENTRATING ON THINGS, SUCH AS READING THE NEWSPAPER OR WATCHING TELEVISION: NOT AT ALL
10. IF YOU CHECKED OFF ANY PROBLEMS, HOW DIFFICULT HAVE THESE PROBLEMS MADE IT FOR YOU TO DO YOUR WORK, TAKE CARE OF THINGS AT HOME, OR GET ALONG WITH OTHER PEOPLE: NOT DIFFICULT AT ALL
SUM OF ALL RESPONSES TO PHQ QUESTIONS 1-9: 0
3. TROUBLE FALLING OR STAYING ASLEEP: NOT AT ALL
SUM OF ALL RESPONSES TO PHQ QUESTIONS 1-9: 0

## 2025-05-16 NOTE — PROGRESS NOTES
Have you been to the ER, urgent care clinic since your last visit?  Hospitalized since your last visit?   no    Have you seen or consulted any other health care providers outside our system since your last visit?   no           
  Eyes:      Pupils: Pupils are equal, round, and reactive to light.   Cardiovascular:      Rate and Rhythm: Normal rate and regular rhythm.      Pulses: Normal pulses.      Heart sounds: Normal heart sounds.   Pulmonary:      Effort: Pulmonary effort is normal.   Abdominal:      General: Abdomen is flat.   Musculoskeletal:         General: Normal range of motion.      Cervical back: Normal range of motion and neck supple.   Skin:     General: Skin is warm.   Neurological:      General: No focal deficit present.      Mental Status: He is alert.   Psychiatric:         Mood and Affect: Mood normal.         Thought Content: Thought content normal.         Judgment: Judgment normal.          ASSESSMENT and PLAN  There are no diagnoses linked to this encounter.   Peter was seen today for annual exam.    Diagnoses and all orders for this visit:    Hypertension, unspecified type  -     Cancel: CBC; Future  -     Cancel: Comprehensive Metabolic Panel; Future  -     CBC with Auto Differential; Future  -     Comprehensive Metabolic Panel; Future  -     Comprehensive Metabolic Panel  -     CBC with Auto Differential   Continue BB   Stay off lotrel   Start benazepril 10 mg qd   Home bp monitoring  Dyslipidemia  -     Cancel: Comprehensive Metabolic Panel; Future  -     Cancel: Lipid Panel; Future  -     Cancel: TSH; Future  -     Comprehensive Metabolic Panel; Future  -     Lipid Panel; Future  -     TSH; Future  -     TSH  -     Lipid Panel  -     Comprehensive Metabolic Panel   On lipitor  Smoker   Has cut back significantly  Benign prostatic hyperplasia, unspecified whether lower urinary tract symptoms present   Fu URO   Palpitations   Controlled on BB   Elevated PSA   Fu URO  Routine physical examination  -     CBC with Auto Differential; Future  -     Comprehensive Metabolic Panel; Future  -     Lipid Panel; Future  -     TSH; Future  -     TSH  -     Lipid Panel  -     Comprehensive Metabolic Panel  -     CBC with Auto